# Patient Record
Sex: MALE | Employment: FULL TIME | URBAN - METROPOLITAN AREA
[De-identification: names, ages, dates, MRNs, and addresses within clinical notes are randomized per-mention and may not be internally consistent; named-entity substitution may affect disease eponyms.]

---

## 2020-03-17 ENCOUNTER — APPOINTMENT (OUTPATIENT)
Dept: GENERAL RADIOLOGY | Age: 25
DRG: 854 | End: 2020-03-17
Attending: PHYSICIAN ASSISTANT
Payer: SELF-PAY

## 2020-03-17 ENCOUNTER — APPOINTMENT (OUTPATIENT)
Dept: CT IMAGING | Age: 25
DRG: 854 | End: 2020-03-17
Attending: EMERGENCY MEDICINE
Payer: SELF-PAY

## 2020-03-17 ENCOUNTER — APPOINTMENT (OUTPATIENT)
Dept: GENERAL RADIOLOGY | Age: 25
DRG: 854 | End: 2020-03-17
Attending: EMERGENCY MEDICINE
Payer: SELF-PAY

## 2020-03-17 ENCOUNTER — HOSPITAL ENCOUNTER (INPATIENT)
Age: 25
LOS: 4 days | Discharge: HOME | DRG: 854 | End: 2020-03-23
Attending: EMERGENCY MEDICINE | Admitting: HOSPITALIST
Payer: SELF-PAY

## 2020-03-17 DIAGNOSIS — L03.116 CELLULITIS OF LEFT THIGH: Primary | ICD-10-CM

## 2020-03-17 DIAGNOSIS — L02.416 ABSCESS OF LEFT THIGH: ICD-10-CM

## 2020-03-17 PROBLEM — A41.9 SEPSIS (HCC): Status: ACTIVE | Noted: 2020-03-17

## 2020-03-17 LAB
ALBUMIN SERPL-MCNC: 3.3 G/DL (ref 3.4–5)
ALBUMIN/GLOB SERPL: 0.8 {RATIO} (ref 0.8–1.7)
ALP SERPL-CCNC: 88 U/L (ref 45–117)
ALT SERPL-CCNC: 195 U/L (ref 16–61)
ANION GAP SERPL CALC-SCNC: 7 MMOL/L (ref 3–18)
APPEARANCE UR: CLEAR
AST SERPL-CCNC: 96 U/L (ref 10–38)
BACTERIA URNS QL MICRO: NEGATIVE /HPF
BASOPHILS # BLD: 0 K/UL (ref 0–0.1)
BASOPHILS NFR BLD: 0 % (ref 0–2)
BILIRUB SERPL-MCNC: 1.6 MG/DL (ref 0.2–1)
BILIRUB UR QL: NEGATIVE
BUN SERPL-MCNC: 8 MG/DL (ref 7–18)
BUN/CREAT SERPL: 8 (ref 12–20)
CALCIUM SERPL-MCNC: 8.8 MG/DL (ref 8.5–10.1)
CHLORIDE SERPL-SCNC: 100 MMOL/L (ref 100–111)
CK SERPL-CCNC: 496 U/L (ref 39–308)
CO2 SERPL-SCNC: 27 MMOL/L (ref 21–32)
COLOR UR: YELLOW
CREAT SERPL-MCNC: 1.01 MG/DL (ref 0.6–1.3)
CRP SERPL-MCNC: 27.3 MG/DL (ref 0–0.3)
DIFFERENTIAL METHOD BLD: ABNORMAL
EOSINOPHIL # BLD: 0 K/UL (ref 0–0.4)
EOSINOPHIL NFR BLD: 0 % (ref 0–5)
EPITH CASTS URNS QL MICRO: NORMAL /LPF (ref 0–5)
ERYTHROCYTE [DISTWIDTH] IN BLOOD BY AUTOMATED COUNT: 12 % (ref 11.6–14.5)
ERYTHROCYTE [SEDIMENTATION RATE] IN BLOOD: 26 MM/HR (ref 0–15)
GLOBULIN SER CALC-MCNC: 4 G/DL (ref 2–4)
GLUCOSE SERPL-MCNC: 142 MG/DL (ref 74–99)
GLUCOSE UR STRIP.AUTO-MCNC: NEGATIVE MG/DL
HBA1C MFR BLD: 6 % (ref 4.2–5.6)
HCT VFR BLD AUTO: 47 % (ref 36–48)
HGB BLD-MCNC: 15.8 G/DL (ref 13–16)
HGB UR QL STRIP: NEGATIVE
KETONES UR QL STRIP.AUTO: ABNORMAL MG/DL
LACTATE SERPL-SCNC: 1.8 MMOL/L (ref 0.4–2)
LEUKOCYTE ESTERASE UR QL STRIP.AUTO: NEGATIVE
LYMPHOCYTES # BLD: 0.9 K/UL (ref 0.9–3.6)
LYMPHOCYTES NFR BLD: 7 % (ref 21–52)
MCH RBC QN AUTO: 30.4 PG (ref 24–34)
MCHC RBC AUTO-ENTMCNC: 33.6 G/DL (ref 31–37)
MCV RBC AUTO: 90.6 FL (ref 74–97)
MONOCYTES # BLD: 1.5 K/UL (ref 0.05–1.2)
MONOCYTES NFR BLD: 12 % (ref 3–10)
NEUTS SEG # BLD: 10.3 K/UL (ref 1.8–8)
NEUTS SEG NFR BLD: 81 % (ref 40–73)
NITRITE UR QL STRIP.AUTO: NEGATIVE
PH UR STRIP: 6 [PH] (ref 5–8)
PLATELET # BLD AUTO: 189 K/UL (ref 135–420)
PMV BLD AUTO: 11.4 FL (ref 9.2–11.8)
POTASSIUM SERPL-SCNC: 3.6 MMOL/L (ref 3.5–5.5)
PROT SERPL-MCNC: 7.3 G/DL (ref 6.4–8.2)
PROT UR STRIP-MCNC: 30 MG/DL
RBC # BLD AUTO: 5.19 M/UL (ref 4.7–5.5)
RBC #/AREA URNS HPF: NEGATIVE /HPF (ref 0–5)
SODIUM SERPL-SCNC: 134 MMOL/L (ref 136–145)
SP GR UR REFRACTOMETRY: 1.01 (ref 1–1.03)
UROBILINOGEN UR QL STRIP.AUTO: 1 EU/DL (ref 0.2–1)
WBC # BLD AUTO: 12.8 K/UL (ref 4.6–13.2)
WBC URNS QL MICRO: NORMAL /HPF (ref 0–4)

## 2020-03-17 PROCEDURE — 73701 CT LOWER EXTREMITY W/DYE: CPT

## 2020-03-17 PROCEDURE — 74011250636 HC RX REV CODE- 250/636: Performed by: EMERGENCY MEDICINE

## 2020-03-17 PROCEDURE — 96367 TX/PROPH/DG ADDL SEQ IV INF: CPT

## 2020-03-17 PROCEDURE — 74011250636 HC RX REV CODE- 250/636: Performed by: PHYSICIAN ASSISTANT

## 2020-03-17 PROCEDURE — 96372 THER/PROPH/DIAG INJ SC/IM: CPT

## 2020-03-17 PROCEDURE — 87040 BLOOD CULTURE FOR BACTERIA: CPT

## 2020-03-17 PROCEDURE — 96365 THER/PROPH/DIAG IV INF INIT: CPT

## 2020-03-17 PROCEDURE — 82550 ASSAY OF CK (CPK): CPT

## 2020-03-17 PROCEDURE — 93005 ELECTROCARDIOGRAM TRACING: CPT

## 2020-03-17 PROCEDURE — 80053 COMPREHEN METABOLIC PANEL: CPT

## 2020-03-17 PROCEDURE — 71045 X-RAY EXAM CHEST 1 VIEW: CPT

## 2020-03-17 PROCEDURE — 99285 EMERGENCY DEPT VISIT HI MDM: CPT

## 2020-03-17 PROCEDURE — 83605 ASSAY OF LACTIC ACID: CPT

## 2020-03-17 PROCEDURE — 74011000258 HC RX REV CODE- 258: Performed by: INTERNAL MEDICINE

## 2020-03-17 PROCEDURE — 96366 THER/PROPH/DIAG IV INF ADDON: CPT

## 2020-03-17 PROCEDURE — 86140 C-REACTIVE PROTEIN: CPT

## 2020-03-17 PROCEDURE — 81001 URINALYSIS AUTO W/SCOPE: CPT

## 2020-03-17 PROCEDURE — 73552 X-RAY EXAM OF FEMUR 2/>: CPT

## 2020-03-17 PROCEDURE — 74011250637 HC RX REV CODE- 250/637: Performed by: PHYSICIAN ASSISTANT

## 2020-03-17 PROCEDURE — 85025 COMPLETE CBC W/AUTO DIFF WBC: CPT

## 2020-03-17 PROCEDURE — 74011636320 HC RX REV CODE- 636/320: Performed by: EMERGENCY MEDICINE

## 2020-03-17 PROCEDURE — 99218 HC RM OBSERVATION: CPT

## 2020-03-17 PROCEDURE — 83036 HEMOGLOBIN GLYCOSYLATED A1C: CPT

## 2020-03-17 PROCEDURE — 74011000258 HC RX REV CODE- 258: Performed by: EMERGENCY MEDICINE

## 2020-03-17 PROCEDURE — 86141 C-REACTIVE PROTEIN HS: CPT

## 2020-03-17 PROCEDURE — 90715 TDAP VACCINE 7 YRS/> IM: CPT | Performed by: EMERGENCY MEDICINE

## 2020-03-17 PROCEDURE — 74011250636 HC RX REV CODE- 250/636: Performed by: INTERNAL MEDICINE

## 2020-03-17 PROCEDURE — 96375 TX/PRO/DX INJ NEW DRUG ADDON: CPT

## 2020-03-17 PROCEDURE — 85652 RBC SED RATE AUTOMATED: CPT

## 2020-03-17 RX ORDER — ONDANSETRON 2 MG/ML
4 INJECTION INTRAMUSCULAR; INTRAVENOUS
Status: COMPLETED | OUTPATIENT
Start: 2020-03-17 | End: 2020-03-17

## 2020-03-17 RX ORDER — CLINDAMYCIN PHOSPHATE 600 MG/50ML
600 INJECTION, SOLUTION INTRAVENOUS ONCE
Status: COMPLETED | OUTPATIENT
Start: 2020-03-17 | End: 2020-03-17

## 2020-03-17 RX ORDER — VANCOMYCIN 2 GRAM/500 ML IN 0.9 % SODIUM CHLORIDE INTRAVENOUS
2000
Status: COMPLETED | OUTPATIENT
Start: 2020-03-17 | End: 2020-03-17

## 2020-03-17 RX ORDER — ACETAMINOPHEN 500 MG
1000 TABLET ORAL
Status: COMPLETED | OUTPATIENT
Start: 2020-03-17 | End: 2020-03-17

## 2020-03-17 RX ORDER — LANOLIN ALCOHOL/MO/W.PET/CERES
12 CREAM (GRAM) TOPICAL
Status: DISCONTINUED | OUTPATIENT
Start: 2020-03-17 | End: 2020-03-23 | Stop reason: HOSPADM

## 2020-03-17 RX ORDER — INSULIN LISPRO 100 [IU]/ML
INJECTION, SOLUTION INTRAVENOUS; SUBCUTANEOUS EVERY 6 HOURS
Status: DISCONTINUED | OUTPATIENT
Start: 2020-03-18 | End: 2020-03-18

## 2020-03-17 RX ORDER — MORPHINE SULFATE 2 MG/ML
2-4 INJECTION, SOLUTION INTRAMUSCULAR; INTRAVENOUS
Status: DISCONTINUED | OUTPATIENT
Start: 2020-03-17 | End: 2020-03-20

## 2020-03-17 RX ORDER — ONDANSETRON 2 MG/ML
4 INJECTION INTRAMUSCULAR; INTRAVENOUS
Status: DISCONTINUED | OUTPATIENT
Start: 2020-03-17 | End: 2020-03-23 | Stop reason: HOSPADM

## 2020-03-17 RX ORDER — PANTOPRAZOLE SODIUM 40 MG/1
40 TABLET, DELAYED RELEASE ORAL
Status: ACTIVE | OUTPATIENT
Start: 2020-03-17 | End: 2020-03-18

## 2020-03-17 RX ORDER — MORPHINE SULFATE 4 MG/ML
4 INJECTION, SOLUTION INTRAMUSCULAR; INTRAVENOUS
Status: COMPLETED | OUTPATIENT
Start: 2020-03-17 | End: 2020-03-17

## 2020-03-17 RX ORDER — IBUPROFEN 200 MG
1 TABLET ORAL DAILY PRN
Status: DISCONTINUED | OUTPATIENT
Start: 2020-03-17 | End: 2020-03-18

## 2020-03-17 RX ORDER — DOCUSATE SODIUM 100 MG/1
100 CAPSULE, LIQUID FILLED ORAL
Status: DISCONTINUED | OUTPATIENT
Start: 2020-03-17 | End: 2020-03-23 | Stop reason: HOSPADM

## 2020-03-17 RX ORDER — DEXTROSE MONOHYDRATE 100 MG/ML
125-250 INJECTION, SOLUTION INTRAVENOUS AS NEEDED
Status: DISCONTINUED | OUTPATIENT
Start: 2020-03-17 | End: 2020-03-23 | Stop reason: HOSPADM

## 2020-03-17 RX ORDER — CLINDAMYCIN PHOSPHATE 600 MG/50ML
600 INJECTION INTRAVENOUS ONCE
Status: DISCONTINUED | OUTPATIENT
Start: 2020-03-17 | End: 2020-03-17

## 2020-03-17 RX ORDER — NALOXONE HYDROCHLORIDE 0.4 MG/ML
0.4 INJECTION, SOLUTION INTRAMUSCULAR; INTRAVENOUS; SUBCUTANEOUS AS NEEDED
Status: DISCONTINUED | OUTPATIENT
Start: 2020-03-17 | End: 2020-03-23 | Stop reason: HOSPADM

## 2020-03-17 RX ORDER — IPRATROPIUM BROMIDE AND ALBUTEROL SULFATE 2.5; .5 MG/3ML; MG/3ML
3 SOLUTION RESPIRATORY (INHALATION)
Status: DISCONTINUED | OUTPATIENT
Start: 2020-03-17 | End: 2020-03-23 | Stop reason: HOSPADM

## 2020-03-17 RX ORDER — SODIUM CHLORIDE 9 MG/ML
125 INJECTION, SOLUTION INTRAVENOUS CONTINUOUS
Status: DISPENSED | OUTPATIENT
Start: 2020-03-17 | End: 2020-03-19

## 2020-03-17 RX ORDER — OXYCODONE AND ACETAMINOPHEN 5; 325 MG/1; MG/1
1 TABLET ORAL
Status: DISCONTINUED | OUTPATIENT
Start: 2020-03-17 | End: 2020-03-18

## 2020-03-17 RX ORDER — MAGNESIUM SULFATE 100 %
4 CRYSTALS MISCELLANEOUS AS NEEDED
Status: DISCONTINUED | OUTPATIENT
Start: 2020-03-17 | End: 2020-03-23 | Stop reason: HOSPADM

## 2020-03-17 RX ORDER — KETOROLAC TROMETHAMINE 30 MG/ML
15 INJECTION, SOLUTION INTRAMUSCULAR; INTRAVENOUS
Status: DISPENSED | OUTPATIENT
Start: 2020-03-17 | End: 2020-03-20

## 2020-03-17 RX ORDER — SODIUM CHLORIDE 0.9 % (FLUSH) 0.9 %
5-10 SYRINGE (ML) INJECTION AS NEEDED
Status: DISCONTINUED | OUTPATIENT
Start: 2020-03-17 | End: 2020-03-23 | Stop reason: HOSPADM

## 2020-03-17 RX ORDER — ACETAMINOPHEN 325 MG/1
650 TABLET ORAL
Status: DISCONTINUED | OUTPATIENT
Start: 2020-03-17 | End: 2020-03-18

## 2020-03-17 RX ADMIN — TETANUS TOXOID, REDUCED DIPHTHERIA TOXOID AND ACELLULAR PERTUSSIS VACCINE, ADSORBED 0.5 ML: 5; 2.5; 8; 8; 2.5 SUSPENSION INTRAMUSCULAR at 14:32

## 2020-03-17 RX ADMIN — SODIUM CHLORIDE 2313 ML: 900 INJECTION, SOLUTION INTRAVENOUS at 13:40

## 2020-03-17 RX ADMIN — CLINDAMYCIN PHOSPHATE 600 MG: 600 INJECTION, SOLUTION INTRAVENOUS at 13:54

## 2020-03-17 RX ADMIN — PIPERACILLIN AND TAZOBACTAM 3.38 G: 3; .375 INJECTION, POWDER, LYOPHILIZED, FOR SOLUTION INTRAVENOUS at 13:43

## 2020-03-17 RX ADMIN — IOPAMIDOL 94 ML: 612 INJECTION, SOLUTION INTRAVENOUS at 16:45

## 2020-03-17 RX ADMIN — SODIUM CHLORIDE 125 ML/HR: 900 INJECTION, SOLUTION INTRAVENOUS at 22:17

## 2020-03-17 RX ADMIN — ACETAMINOPHEN 1000 MG: 500 TABLET, FILM COATED ORAL at 18:17

## 2020-03-17 RX ADMIN — MORPHINE SULFATE 4 MG: 4 INJECTION, SOLUTION INTRAMUSCULAR; INTRAVENOUS at 13:44

## 2020-03-17 RX ADMIN — ONDANSETRON 4 MG: 2 INJECTION INTRAMUSCULAR; INTRAVENOUS at 13:44

## 2020-03-17 RX ADMIN — VANCOMYCIN HYDROCHLORIDE 2000 MG: 10 INJECTION, POWDER, LYOPHILIZED, FOR SOLUTION INTRAVENOUS at 13:43

## 2020-03-17 RX ADMIN — PIPERACILLIN AND TAZOBACTAM 3.38 G: 3; .375 INJECTION, POWDER, LYOPHILIZED, FOR SOLUTION INTRAVENOUS at 23:58

## 2020-03-17 NOTE — CONSULTS
Consult Note        Assessment:    1. Left thigh cellulitis, evaluate for abscess      PLAN:    1. Case discussed with Dr Esau Gitelman. He recommends, IM admission for IV abx. Blood cultures and we will follow up on CT. NPO past midnight tonight. Frequent neurovascular checks although no sign of compartment syndrome on exam at this time. HPI: Fausto Crespo is a 22 y.o. male patient presents with left thigh swelling, redness and drainage. He does not recall a history of injury but states that the redness and swelling have progressed over 5 days. He now has a small wound at the anterior thigh with drainage. He is otherwise healthy and active. He is febrile at 101.3 with a white count of 12.8. Sed rate elevated at 26. CRP pending    History reviewed. No pertinent past medical history. History reviewed. No pertinent surgical history. Prior to Admission medications    Not on File   No Known Allergies   Social History     Socioeconomic History    Marital status: SINGLE     Spouse name: Not on file    Number of children: Not on file    Years of education: Not on file    Highest education level: Not on file   Occupational History    Not on file   Social Needs    Financial resource strain: Not on file    Food insecurity     Worry: Not on file     Inability: Not on file    Transportation needs     Medical: Not on file     Non-medical: Not on file   Tobacco Use    Smoking status: Light Tobacco Smoker    Smokeless tobacco: Never Used   Substance and Sexual Activity    Alcohol use:  Yes    Drug use: Not on file    Sexual activity: Not on file   Lifestyle    Physical activity     Days per week: Not on file     Minutes per session: Not on file    Stress: Not on file   Relationships    Social connections     Talks on phone: Not on file     Gets together: Not on file     Attends Confucianism service: Not on file     Active member of club or organization: Not on file     Attends meetings of clubs or organizations: Not on file     Relationship status: Not on file    Intimate partner violence     Fear of current or ex partner: Not on file     Emotionally abused: Not on file     Physically abused: Not on file     Forced sexual activity: Not on file   Other Topics Concern    Not on file   Social History Narrative    Not on file       Blood pressure 133/70, pulse (!) 112, temperature (!) 101.3 °F (38.5 °C), resp. rate 20, height 4' 10\" (1.473 m), weight 77.1 kg (170 lb), SpO2 100 %. CBC w/Diff   Lab Results   Component Value Date/Time    WBC 12.8 03/17/2020 01:30 PM    RBC 5.19 03/17/2020 01:30 PM    HCT 47.0 03/17/2020 01:30 PM          Physical Assessment:  General: in no apparent distress   Wound:  Small pin hole wound at the anterior, mid portion of the thigh with mild purulent drainage. Fluctuance in the area. Extremities:  Diffuse erythema and swelling to the anterior thigh. Compartments are soft and no severe pain on palpation. No pain with left hip motion or knee motion. No knee effusion. Will flex and extend ankles and toes to command. Sensation intact to light touch including the first web space   Dressing:  None ortho   Urologic voiding   DVT Exam:     No exam evidence to suggest DVT. Compartments soft and NT. Radiology:   Left femur xrays:  BONES: No evidence of acute fracture or dislocation. Joint spaces and alignment  are maintained. No area of erosion or aggressive-appearing bone destruction. .     SOFT TISSUES: Soft tissue swelling about the lateral thigh    CT femur pending         - Faith Cooper PA-C  3/17/2020  Office [phone]  Cell [phone]

## 2020-03-17 NOTE — ED NOTES
I performed a brief history of the patient here in triage and I have determined that pt will need further treatment and evaluation from the main side ER physician. I have placed initial orders based on the history to help in expediting patients care. Pt with extensive cellulitis to left thigh, tachycardia and fever.      Visit Vitals  /81 (BP 1 Location: Left arm, BP Patient Position: Sitting)   Pulse (!) 144   Temp (!) 101.3 °F (38.5 °C)   Resp 20   Ht 4' 10\" (1.473 m)   Wt 77.1 kg (170 lb)   SpO2 100%   BMI 35.53 kg/m²      SIMON Michaels 12:25 PM

## 2020-03-17 NOTE — ED NOTES
5:35 PM   Assumed care of the pt from preceding provider Dr. Edwina Cooley (who took on care this morning from Dr. Holland Hernandez). I have discussed the case in detail with Dr. Edwina Cooley. Pt is back in the room from CT scan. Sepsis bundle has been initiated and the pt has received clindamycin, vancomycin, and zosyn IV. Ortho has already been consulted as the pt appears to have a deep abscess tracking into the muscle. If there is in fact muscle involvement on the CT scan the pt will be admitted with ortho/surgery care for a washout (likely in the morning). I have seen and reexamined the patient at bedside. Patient is resting, in no apparent distress. Repeat temperature is 100.1 °F oral.  Patient denies pain and nausea at this time. Compartments are soft, not concerning for compartment syndrome. Patient is made aware that we are still waiting on the CT report to determine the exact course of his admission. We will plan to consult Ortho versus general surgery once his CT report is resulted. Estrella Hoyt PA-C     6:03 PM CT resulted, shows superficial and deep cellulitis, extending to the vastus lateralis muscle. Ortho paged again. Jimmie Simmons PA-C     6:40 PM Spoke with Dr. Oneida Pham ortho on call who recommends admitting the pt under the hospitalist service with ortho consult in the morning. Pt is recommended to remain NPO until he is seen by ortho in the morning and is advised to have an A1C level drawn as well given, his glucose of 142. This has been ordered. Hospitalist has been paged. Jimmie Simmons PA-C     6:58 PM Spoke with Dr. Poornima Bill, hospitalist on call until 7 pm. Dr. Poornima Bill made aware of the pt but will be passing him along to Dr. Jonelle Perez, night hospitalist for consult. Estrella Broussard PA-C     7:11 PM Spoke with mckenzie Trinidad hospitalist on call, and discussed the case. Dr. Jonelle Perez agrees to admit the pt at this time.  Estrella Hoyt PA-C

## 2020-03-17 NOTE — ED PROVIDER NOTES
EMERGENCY DEPARTMENT HISTORY AND PHYSICAL EXAM      Date: 3/17/2020  Patient Name: Lauren Wilson    History of Presenting Illness     Chief Complaint   Patient presents with    Skin Infection       History Provided By: Patient    Chief Complaint: Painful swelling left thigh    Additional History (Context): Lauren Wilson is a 22 y.o. male with No significant past medical history who presents with a painful swollen left thigh. No specific history of trauma or injury, however while he is sleeping thinks he may have gotten poked with something or bitten by something five days ago in his anterior right thigh. Complains of significant and worsening pain and redness and warmth to his entire thigh from the knee to the groin, as well as anterior mid thigh with a boil lesion that has had some drainage. Also complains of subjective fevers and generalized malaise and fatigue and weakness. PCP: None    Current Facility-Administered Medications   Medication Dose Route Frequency Provider Last Rate Last Dose    sodium chloride (NS) flush 5-10 mL  5-10 mL IntraVENous PRN SIMON Locke        vancomycin (VANCOCIN) 2000 mg in  ml infusion  2,000 mg IntraVENous NOW Cody Vera  mL/hr at 03/17/20 1343 2,000 mg at 03/17/20 1343    piperacillin-tazobactam (ZOSYN) 3.375 g in 0.9% sodium chloride (MBP/ADV) 100 mL MBP  3.375 g IntraVENous ONCE Cody Vera  mL/hr at 03/17/20 1343 3.375 g at 03/17/20 1343    clindamycin (CLEOCIN) 600mg NS 50 mL IVPB (premix)  600 mg IntraVENous ONCE Cody Vera MD   600 mg at 03/17/20 1354    diph,Pertuss(AC),Tet Vac-PF (BOOSTRIX) suspension 0.5 mL  0.5 mL IntraMUSCular PRIOR TO DISCHARGE Cody Vera MD           Past History     Past Medical History:  History reviewed. No pertinent past medical history. Past Surgical History:  History reviewed. No pertinent surgical history. Family History:  History reviewed.  No pertinent family history. Social History:  Social History     Tobacco Use    Smoking status: Light Tobacco Smoker    Smokeless tobacco: Never Used   Substance Use Topics    Alcohol use: Yes    Drug use: Not on file       Allergies:  No Known Allergies      Review of Systems   Review of Systems   Constitutional: Positive for chills, fatigue and unexpected weight change. Negative for fever. HENT: Negative for congestion, rhinorrhea, sore throat and trouble swallowing. Eyes: Negative for discharge, redness and itching. Respiratory: Negative for cough, shortness of breath, wheezing and stridor. Cardiovascular: Positive for leg swelling. Negative for chest pain and palpitations. Gastrointestinal: Negative for abdominal pain, blood in stool, diarrhea, nausea and vomiting. Genitourinary: Negative for difficulty urinating and dysuria. Musculoskeletal: Positive for myalgias. Negative for arthralgias, back pain and joint swelling. Skin: Positive for color change, rash and wound. Neurological: Negative for syncope and light-headedness. Psychiatric/Behavioral: Negative for behavioral problems and confusion. All other systems reviewed and are negative. Physical Exam     Vitals:    03/17/20 1222   BP: 130/81   Pulse: (!) 144   Resp: 20   Temp: (!) 101.3 °F (38.5 °C)   SpO2: 100%   Weight: 77.1 kg (170 lb)   Height: 4' 10\" (1.473 m)     Physical Exam  Vitals signs and nursing note reviewed. Constitutional:       General: He is in acute distress. Appearance: He is well-developed and normal weight. He is ill-appearing. HENT:      Head: Normocephalic and atraumatic. Mouth/Throat:      Mouth: Mucous membranes are moist.   Eyes:      Extraocular Movements: Extraocular movements intact. Conjunctiva/sclera: Conjunctivae normal.      Pupils: Pupils are equal, round, and reactive to light. Neck:      Musculoskeletal: Normal range of motion and neck supple.    Cardiovascular:      Rate and Rhythm: Regular rhythm. Tachycardia present. Pulses: Normal pulses. Heart sounds: Normal heart sounds. No murmur. Pulmonary:      Effort: Pulmonary effort is normal.      Breath sounds: Normal breath sounds. No wheezing. Abdominal:      General: Bowel sounds are normal.      Palpations: Abdomen is soft. Tenderness: There is no abdominal tenderness. Musculoskeletal: Normal range of motion. General: Swelling, tenderness and signs of injury present. Comments: Left thigh anteriorly with a approximately 1 cm diameter scab. Has marked swelling of the entire thigh from the groin to the knee anteriorly laterally and posterior, as well as fluctuance in that area. Bedside ultrasound shows an extensive abscess tracking underneath the skin above the fascia. Neurovascular intact distally. Skin:     General: Skin is warm and dry. Capillary Refill: Capillary refill takes less than 2 seconds. Findings: Erythema, lesion and rash present. Neurological:      Mental Status: He is alert and oriented to person, place, and time. Psychiatric:         Behavior: Behavior normal.           Diagnostic Study Results     Labs -     Recent Results (from the past 12 hour(s))   CBC WITH AUTOMATED DIFF    Collection Time: 03/17/20  1:30 PM   Result Value Ref Range    WBC 12.8 4.6 - 13.2 K/uL    RBC 5.19 4.70 - 5.50 M/uL    HGB 15.8 13.0 - 16.0 g/dL    HCT 47.0 36.0 - 48.0 %    MCV 90.6 74.0 - 97.0 FL    MCH 30.4 24.0 - 34.0 PG    MCHC 33.6 31.0 - 37.0 g/dL    RDW 12.0 11.6 - 14.5 %    PLATELET 033 787 - 805 K/uL    MPV 11.4 9.2 - 11.8 FL    NEUTROPHILS 81 (H) 40 - 73 %    LYMPHOCYTES 7 (L) 21 - 52 %    MONOCYTES 12 (H) 3 - 10 %    EOSINOPHILS 0 0 - 5 %    BASOPHILS 0 0 - 2 %    ABS. NEUTROPHILS 10.3 (H) 1.8 - 8.0 K/UL    ABS. LYMPHOCYTES 0.9 0.9 - 3.6 K/UL    ABS. MONOCYTES 1.5 (H) 0.05 - 1.2 K/UL    ABS. EOSINOPHILS 0.0 0.0 - 0.4 K/UL    ABS.  BASOPHILS 0.0 0.0 - 0.1 K/UL    DF AUTOMATED Radiologic Studies -   XR CHEST PORT   Final Result   IMPRESSION:      No active cardiopulmonary disease. XR FEMUR RT 2 VS    (Results Pending)   CT LOW EXT LT W CONT    (Results Pending)     CT Results  (Last 48 hours)    None        CXR Results  (Last 48 hours)               03/17/20 1314  XR CHEST PORT Final result    Impression:  IMPRESSION:       No active cardiopulmonary disease. Narrative:  EXAM: XR CHEST PORT       CLINICAL INDICATION/HISTORY: meets SIRS criteria. Shortness of breath. Chest   pain.   -Additional: None       COMPARISON: None       TECHNIQUE: Portable frontal view of the chest       _______________       FINDINGS:       SUPPORT DEVICES: None. HEART AND MEDIASTINUM: Unremarkable. LUNGS AND PLEURAL SPACES: Unremarkable. BONY THORAX AND SOFT TISSUES: Unremarkable.       _______________                   Medical Decision Making   I am the first provider for this patient. I reviewed the vital signs, available nursing notes, past medical history, past surgical history, family history and social history. Vital Signs-Reviewed the patient's vital signs. Records Reviewed: Nursing Notes    ED Course:   Felt better after fluids and analgesics and antibiotics in the emergency department    Disposition:  Pending at time of turnover      Provider Notes (Medical Decision Making):   Turnover at 2pm:  Significant infection of the left thigh rule out deep space infection, lesser concern for necrotizing fasciitis. Broad-spectrum antibiotics have been given, broad-based diagnostic evaluation ordered to include labs and plain x-rays and a CT scan. Orthopedics has been consulted to this is aware and will follow as imaging becomes available. Consult:  Discussed care with Regina, Specialty: orthopedics PA. Standard discussion; including history of patients chief complaint, available diagnostic results, and treatment course. Will see after CT/imaging.   1:54 PM, 3/17/2020     1:55 PM : Pt care transferred to Dr. Woody Loving. ,ED provider. History of patient complaint(s), available diagnostic reports and current treatment plan has been discussed thoroughly. Bedside rounding on patient occured : yes . Intended disposition of patient: admit. Pending diagnostics reports and/or labs (please list): labs, xrays, CT, consults. Diagnosis     Clinical Impression:   1. Cellulitis of left thigh    2.  Abscess of left thigh

## 2020-03-18 ENCOUNTER — ANESTHESIA EVENT (OUTPATIENT)
Dept: SURGERY | Age: 25
DRG: 854 | End: 2020-03-18
Payer: SELF-PAY

## 2020-03-18 ENCOUNTER — ANESTHESIA (OUTPATIENT)
Dept: SURGERY | Age: 25
DRG: 854 | End: 2020-03-18
Payer: SELF-PAY

## 2020-03-18 PROBLEM — R73.03 PREDIABETES: Status: ACTIVE | Noted: 2020-03-18

## 2020-03-18 LAB
ALBUMIN SERPL-MCNC: 2.4 G/DL (ref 3.4–5)
ALBUMIN/GLOB SERPL: 0.7 {RATIO} (ref 0.8–1.7)
ALP SERPL-CCNC: 70 U/L (ref 45–117)
ALT SERPL-CCNC: 137 U/L (ref 16–61)
ANION GAP SERPL CALC-SCNC: 9 MMOL/L (ref 3–18)
AST SERPL-CCNC: 55 U/L (ref 10–38)
ATRIAL RATE: 109 BPM
BASOPHILS # BLD: 0 K/UL (ref 0–0.1)
BASOPHILS NFR BLD: 0 % (ref 0–2)
BILIRUB SERPL-MCNC: 1 MG/DL (ref 0.2–1)
BUN SERPL-MCNC: 8 MG/DL (ref 7–18)
BUN/CREAT SERPL: 10 (ref 12–20)
CALCIUM SERPL-MCNC: 8.4 MG/DL (ref 8.5–10.1)
CALCULATED P AXIS, ECG09: 57 DEGREES
CALCULATED R AXIS, ECG10: 69 DEGREES
CALCULATED T AXIS, ECG11: 8 DEGREES
CHLORIDE SERPL-SCNC: 105 MMOL/L (ref 100–111)
CO2 SERPL-SCNC: 26 MMOL/L (ref 21–32)
CREAT SERPL-MCNC: 0.78 MG/DL (ref 0.6–1.3)
CRP SERPL HS-MCNC: >9.5 MG/L
DIAGNOSIS, 93000: NORMAL
DIFFERENTIAL METHOD BLD: ABNORMAL
EOSINOPHIL # BLD: 0.1 K/UL (ref 0–0.4)
EOSINOPHIL NFR BLD: 1 % (ref 0–5)
ERYTHROCYTE [DISTWIDTH] IN BLOOD BY AUTOMATED COUNT: 12.2 % (ref 11.6–14.5)
GLOBULIN SER CALC-MCNC: 3.4 G/DL (ref 2–4)
GLUCOSE BLD STRIP.AUTO-MCNC: 109 MG/DL (ref 70–110)
GLUCOSE BLD STRIP.AUTO-MCNC: 110 MG/DL (ref 70–110)
GLUCOSE BLD STRIP.AUTO-MCNC: 90 MG/DL (ref 70–110)
GLUCOSE SERPL-MCNC: 96 MG/DL (ref 74–99)
HCT VFR BLD AUTO: 41.8 % (ref 36–48)
HGB BLD-MCNC: 13.9 G/DL (ref 13–16)
INR PPP: 1.3 (ref 0.8–1.2)
LYMPHOCYTES # BLD: 1.2 K/UL (ref 0.9–3.6)
LYMPHOCYTES NFR BLD: 10 % (ref 21–52)
MCH RBC QN AUTO: 30.4 PG (ref 24–34)
MCHC RBC AUTO-ENTMCNC: 33.3 G/DL (ref 31–37)
MCV RBC AUTO: 91.5 FL (ref 74–97)
MONOCYTES # BLD: 1.3 K/UL (ref 0.05–1.2)
MONOCYTES NFR BLD: 10 % (ref 3–10)
NEUTS SEG # BLD: 10 K/UL (ref 1.8–8)
NEUTS SEG NFR BLD: 79 % (ref 40–73)
P-R INTERVAL, ECG05: 134 MS
PLATELET # BLD AUTO: 201 K/UL (ref 135–420)
PMV BLD AUTO: 11.3 FL (ref 9.2–11.8)
POTASSIUM SERPL-SCNC: 3.7 MMOL/L (ref 3.5–5.5)
PROT SERPL-MCNC: 5.8 G/DL (ref 6.4–8.2)
PROTHROMBIN TIME: 16.3 SEC (ref 11.5–15.2)
Q-T INTERVAL, ECG07: 296 MS
QRS DURATION, ECG06: 82 MS
QTC CALCULATION (BEZET), ECG08: 398 MS
RBC # BLD AUTO: 4.57 M/UL (ref 4.7–5.5)
SODIUM SERPL-SCNC: 140 MMOL/L (ref 136–145)
VENTRICULAR RATE, ECG03: 109 BPM
WBC # BLD AUTO: 12.6 K/UL (ref 4.6–13.2)

## 2020-03-18 PROCEDURE — 74011250636 HC RX REV CODE- 250/636: Performed by: ORTHOPAEDIC SURGERY

## 2020-03-18 PROCEDURE — 96375 TX/PRO/DX INJ NEW DRUG ADDON: CPT

## 2020-03-18 PROCEDURE — 74011250636 HC RX REV CODE- 250/636: Performed by: NURSE ANESTHETIST, CERTIFIED REGISTERED

## 2020-03-18 PROCEDURE — 87077 CULTURE AEROBIC IDENTIFY: CPT

## 2020-03-18 PROCEDURE — 82962 GLUCOSE BLOOD TEST: CPT

## 2020-03-18 PROCEDURE — 85025 COMPLETE CBC W/AUTO DIFF WBC: CPT

## 2020-03-18 PROCEDURE — 74011000258 HC RX REV CODE- 258: Performed by: INTERNAL MEDICINE

## 2020-03-18 PROCEDURE — 76010000149 HC OR TIME 1 TO 1.5 HR: Performed by: ORTHOPAEDIC SURGERY

## 2020-03-18 PROCEDURE — 77030019952 HC CANSTR VAC ASST KCON -B

## 2020-03-18 PROCEDURE — 74011250636 HC RX REV CODE- 250/636: Performed by: INTERNAL MEDICINE

## 2020-03-18 PROCEDURE — 87205 SMEAR GRAM STAIN: CPT

## 2020-03-18 PROCEDURE — 77030002916 HC SUT ETHLN J&J -A: Performed by: ORTHOPAEDIC SURGERY

## 2020-03-18 PROCEDURE — 74011000250 HC RX REV CODE- 250: Performed by: NURSE ANESTHETIST, CERTIFIED REGISTERED

## 2020-03-18 PROCEDURE — 96366 THER/PROPH/DIAG IV INF ADDON: CPT

## 2020-03-18 PROCEDURE — 74011000272 HC RX REV CODE- 272: Performed by: ORTHOPAEDIC SURGERY

## 2020-03-18 PROCEDURE — 74011000258 HC RX REV CODE- 258: Performed by: ORTHOPAEDIC SURGERY

## 2020-03-18 PROCEDURE — 74011250637 HC RX REV CODE- 250/637: Performed by: ORTHOPAEDIC SURGERY

## 2020-03-18 PROCEDURE — 77030019934 HC DRSG VAC ASST KCON -B: Performed by: ORTHOPAEDIC SURGERY

## 2020-03-18 PROCEDURE — 99218 HC RM OBSERVATION: CPT

## 2020-03-18 PROCEDURE — 36415 COLL VENOUS BLD VENIPUNCTURE: CPT

## 2020-03-18 PROCEDURE — 76060000033 HC ANESTHESIA 1 TO 1.5 HR: Performed by: ORTHOPAEDIC SURGERY

## 2020-03-18 PROCEDURE — 76210000006 HC OR PH I REC 0.5 TO 1 HR: Performed by: ORTHOPAEDIC SURGERY

## 2020-03-18 PROCEDURE — 80053 COMPREHEN METABOLIC PANEL: CPT

## 2020-03-18 PROCEDURE — 0K9R0ZX DRAINAGE OF LEFT UPPER LEG MUSCLE, OPEN APPROACH, DIAGNOSTIC: ICD-10-PCS | Performed by: ORTHOPAEDIC SURGERY

## 2020-03-18 PROCEDURE — 74011000250 HC RX REV CODE- 250: Performed by: ORTHOPAEDIC SURGERY

## 2020-03-18 PROCEDURE — 77030031139 HC SUT VCRL2 J&J -A: Performed by: ORTHOPAEDIC SURGERY

## 2020-03-18 PROCEDURE — 77030012510 HC MSK AIRWY LMA TELE -B: Performed by: ANESTHESIOLOGY

## 2020-03-18 PROCEDURE — 77030018836 HC SOL IRR NACL ICUM -A: Performed by: ORTHOPAEDIC SURGERY

## 2020-03-18 PROCEDURE — 87186 SC STD MICRODIL/AGAR DIL: CPT

## 2020-03-18 PROCEDURE — 85610 PROTHROMBIN TIME: CPT

## 2020-03-18 PROCEDURE — L1830 KO IMMOB CANVAS LONG PRE OTS: HCPCS | Performed by: ORTHOPAEDIC SURGERY

## 2020-03-18 PROCEDURE — 96376 TX/PRO/DX INJ SAME DRUG ADON: CPT

## 2020-03-18 PROCEDURE — 87075 CULTR BACTERIA EXCEPT BLOOD: CPT

## 2020-03-18 RX ORDER — SODIUM CHLORIDE 0.9 % (FLUSH) 0.9 %
5-40 SYRINGE (ML) INJECTION EVERY 8 HOURS
Status: DISCONTINUED | OUTPATIENT
Start: 2020-03-18 | End: 2020-03-23 | Stop reason: HOSPADM

## 2020-03-18 RX ORDER — MORPHINE SULFATE 2 MG/ML
2 INJECTION, SOLUTION INTRAMUSCULAR; INTRAVENOUS
Status: DISCONTINUED | OUTPATIENT
Start: 2020-03-18 | End: 2020-03-23 | Stop reason: HOSPADM

## 2020-03-18 RX ORDER — ONDANSETRON 2 MG/ML
4 INJECTION INTRAMUSCULAR; INTRAVENOUS ONCE
Status: DISCONTINUED | OUTPATIENT
Start: 2020-03-18 | End: 2020-03-18 | Stop reason: HOSPADM

## 2020-03-18 RX ORDER — HYDROMORPHONE HYDROCHLORIDE 1 MG/ML
0.5 INJECTION, SOLUTION INTRAMUSCULAR; INTRAVENOUS; SUBCUTANEOUS
Status: DISCONTINUED | OUTPATIENT
Start: 2020-03-18 | End: 2020-03-18 | Stop reason: HOSPADM

## 2020-03-18 RX ORDER — OXYCODONE AND ACETAMINOPHEN 10; 325 MG/1; MG/1
2 TABLET ORAL
Status: DISCONTINUED | OUTPATIENT
Start: 2020-03-18 | End: 2020-03-23 | Stop reason: HOSPADM

## 2020-03-18 RX ORDER — PROPOFOL 10 MG/ML
INJECTION, EMULSION INTRAVENOUS AS NEEDED
Status: DISCONTINUED | OUTPATIENT
Start: 2020-03-18 | End: 2020-03-18 | Stop reason: HOSPADM

## 2020-03-18 RX ORDER — FENTANYL CITRATE 50 UG/ML
25 INJECTION, SOLUTION INTRAMUSCULAR; INTRAVENOUS AS NEEDED
Status: COMPLETED | OUTPATIENT
Start: 2020-03-18 | End: 2020-03-18

## 2020-03-18 RX ORDER — CEFAZOLIN SODIUM 1 G/3ML
INJECTION, POWDER, FOR SOLUTION INTRAMUSCULAR; INTRAVENOUS AS NEEDED
Status: DISCONTINUED | OUTPATIENT
Start: 2020-03-18 | End: 2020-03-18 | Stop reason: HOSPADM

## 2020-03-18 RX ORDER — DIPHENHYDRAMINE HYDROCHLORIDE 50 MG/ML
12.5 INJECTION, SOLUTION INTRAMUSCULAR; INTRAVENOUS
Status: DISCONTINUED | OUTPATIENT
Start: 2020-03-18 | End: 2020-03-18 | Stop reason: HOSPADM

## 2020-03-18 RX ORDER — INSULIN LISPRO 100 [IU]/ML
INJECTION, SOLUTION INTRAVENOUS; SUBCUTANEOUS
Status: DISCONTINUED | OUTPATIENT
Start: 2020-03-18 | End: 2020-03-23 | Stop reason: HOSPADM

## 2020-03-18 RX ORDER — FENTANYL CITRATE 50 UG/ML
INJECTION, SOLUTION INTRAMUSCULAR; INTRAVENOUS AS NEEDED
Status: DISCONTINUED | OUTPATIENT
Start: 2020-03-18 | End: 2020-03-18 | Stop reason: HOSPADM

## 2020-03-18 RX ORDER — SODIUM CHLORIDE, SODIUM LACTATE, POTASSIUM CHLORIDE, CALCIUM CHLORIDE 600; 310; 30; 20 MG/100ML; MG/100ML; MG/100ML; MG/100ML
100 INJECTION, SOLUTION INTRAVENOUS CONTINUOUS
Status: DISCONTINUED | OUTPATIENT
Start: 2020-03-18 | End: 2020-03-18 | Stop reason: HOSPADM

## 2020-03-18 RX ORDER — OXYCODONE AND ACETAMINOPHEN 5; 325 MG/1; MG/1
1 TABLET ORAL
Status: DISCONTINUED | OUTPATIENT
Start: 2020-03-18 | End: 2020-03-23 | Stop reason: HOSPADM

## 2020-03-18 RX ORDER — SODIUM CHLORIDE 0.9 % (FLUSH) 0.9 %
5-40 SYRINGE (ML) INJECTION AS NEEDED
Status: DISCONTINUED | OUTPATIENT
Start: 2020-03-18 | End: 2020-03-23 | Stop reason: HOSPADM

## 2020-03-18 RX ORDER — MIDAZOLAM HYDROCHLORIDE 1 MG/ML
INJECTION, SOLUTION INTRAMUSCULAR; INTRAVENOUS AS NEEDED
Status: DISCONTINUED | OUTPATIENT
Start: 2020-03-18 | End: 2020-03-18 | Stop reason: HOSPADM

## 2020-03-18 RX ORDER — DEXAMETHASONE SODIUM PHOSPHATE 4 MG/ML
INJECTION, SOLUTION INTRA-ARTICULAR; INTRALESIONAL; INTRAMUSCULAR; INTRAVENOUS; SOFT TISSUE AS NEEDED
Status: DISCONTINUED | OUTPATIENT
Start: 2020-03-18 | End: 2020-03-18 | Stop reason: HOSPADM

## 2020-03-18 RX ORDER — SODIUM CHLORIDE, SODIUM LACTATE, POTASSIUM CHLORIDE, CALCIUM CHLORIDE 600; 310; 30; 20 MG/100ML; MG/100ML; MG/100ML; MG/100ML
INJECTION, SOLUTION INTRAVENOUS
Status: DISCONTINUED | OUTPATIENT
Start: 2020-03-18 | End: 2020-03-18 | Stop reason: HOSPADM

## 2020-03-18 RX ORDER — PHENYLEPHRINE HCL IN 0.9% NACL 1 MG/10 ML
SYRINGE (ML) INTRAVENOUS AS NEEDED
Status: DISCONTINUED | OUTPATIENT
Start: 2020-03-18 | End: 2020-03-18 | Stop reason: HOSPADM

## 2020-03-18 RX ORDER — ONDANSETRON 2 MG/ML
INJECTION INTRAMUSCULAR; INTRAVENOUS AS NEEDED
Status: DISCONTINUED | OUTPATIENT
Start: 2020-03-18 | End: 2020-03-18 | Stop reason: HOSPADM

## 2020-03-18 RX ORDER — LIDOCAINE HYDROCHLORIDE 20 MG/ML
INJECTION, SOLUTION EPIDURAL; INFILTRATION; INTRACAUDAL; PERINEURAL AS NEEDED
Status: DISCONTINUED | OUTPATIENT
Start: 2020-03-18 | End: 2020-03-18 | Stop reason: HOSPADM

## 2020-03-18 RX ADMIN — Medication 200 MCG: at 17:04

## 2020-03-18 RX ADMIN — CEFAZOLIN 2 G: 1 INJECTION, POWDER, FOR SOLUTION INTRAVENOUS at 17:21

## 2020-03-18 RX ADMIN — Medication 300 MCG: at 17:31

## 2020-03-18 RX ADMIN — Medication 200 MCG: at 17:15

## 2020-03-18 RX ADMIN — KETOROLAC TROMETHAMINE 15 MG: 30 INJECTION, SOLUTION INTRAMUSCULAR at 08:29

## 2020-03-18 RX ADMIN — MIDAZOLAM 2 MG: 1 INJECTION INTRAMUSCULAR; INTRAVENOUS at 16:52

## 2020-03-18 RX ADMIN — FENTANYL CITRATE 50 MCG: 50 INJECTION, SOLUTION INTRAMUSCULAR; INTRAVENOUS at 17:18

## 2020-03-18 RX ADMIN — KETOROLAC TROMETHAMINE 15 MG: 30 INJECTION, SOLUTION INTRAMUSCULAR at 13:58

## 2020-03-18 RX ADMIN — PROPOFOL 200 MG: 10 INJECTION, EMULSION INTRAVENOUS at 16:52

## 2020-03-18 RX ADMIN — PIPERACILLIN AND TAZOBACTAM 3.38 G: 3; .375 INJECTION, POWDER, LYOPHILIZED, FOR SOLUTION INTRAVENOUS at 11:17

## 2020-03-18 RX ADMIN — MORPHINE SULFATE 2 MG: 2 INJECTION, SOLUTION INTRAMUSCULAR; INTRAVENOUS at 21:06

## 2020-03-18 RX ADMIN — SODIUM CHLORIDE, SODIUM LACTATE, POTASSIUM CHLORIDE, AND CALCIUM CHLORIDE: 600; 310; 30; 20 INJECTION, SOLUTION INTRAVENOUS at 16:34

## 2020-03-18 RX ADMIN — ONDANSETRON 4 MG: 2 INJECTION INTRAMUSCULAR; INTRAVENOUS at 13:58

## 2020-03-18 RX ADMIN — FENTANYL CITRATE 25 MCG: 50 INJECTION INTRAMUSCULAR; INTRAVENOUS at 18:28

## 2020-03-18 RX ADMIN — Medication 300 MCG: at 17:46

## 2020-03-18 RX ADMIN — ONDANSETRON 4 MG: 2 SOLUTION INTRAMUSCULAR; INTRAVENOUS at 17:28

## 2020-03-18 RX ADMIN — OXYCODONE HYDROCHLORIDE AND ACETAMINOPHEN 2 TABLET: 10; 325 TABLET ORAL at 19:04

## 2020-03-18 RX ADMIN — PIPERACILLIN AND TAZOBACTAM 3.38 G: 3; .375 INJECTION, POWDER, LYOPHILIZED, FOR SOLUTION INTRAVENOUS at 06:00

## 2020-03-18 RX ADMIN — SODIUM CHLORIDE 125 ML/HR: 900 INJECTION, SOLUTION INTRAVENOUS at 19:06

## 2020-03-18 RX ADMIN — LIDOCAINE HYDROCHLORIDE 100 MG: 20 INJECTION, SOLUTION INTRAVENOUS at 16:52

## 2020-03-18 RX ADMIN — SODIUM CHLORIDE 1250 MG: 900 INJECTION, SOLUTION INTRAVENOUS at 02:22

## 2020-03-18 RX ADMIN — PIPERACILLIN AND TAZOBACTAM 3.38 G: 3; .375 INJECTION, POWDER, LYOPHILIZED, FOR SOLUTION INTRAVENOUS at 19:03

## 2020-03-18 RX ADMIN — SODIUM CHLORIDE 125 ML/HR: 900 INJECTION, SOLUTION INTRAVENOUS at 08:34

## 2020-03-18 RX ADMIN — DEXAMETHASONE SODIUM PHOSPHATE 8 MG: 4 INJECTION, SOLUTION INTRA-ARTICULAR; INTRALESIONAL; INTRAMUSCULAR; INTRAVENOUS; SOFT TISSUE at 17:11

## 2020-03-18 RX ADMIN — FENTANYL CITRATE 25 MCG: 50 INJECTION INTRAMUSCULAR; INTRAVENOUS at 18:23

## 2020-03-18 NOTE — PROGRESS NOTES
Problem: Falls - Risk of  Goal: *Absence of Falls  Description: Document Ronaldo Reas Fall Risk and appropriate interventions in the flowsheet. 3/18/2020 0830 by Shelton Cuellar RN  Outcome: Progressing Towards Goal  Note: Fall Risk Interventions:            Medication Interventions: Patient to call before getting OOB, Teach patient to arise slowly    Elimination Interventions: Urinal in reach, Patient to call for help with toileting needs, Call light in reach           3/18/2020 0830 by Shelton Cuellar RN  Outcome: Progressing Towards Goal  Note: Fall Risk Interventions:            Medication Interventions: Patient to call before getting OOB, Teach patient to arise slowly    Elimination Interventions: Urinal in reach, Patient to call for help with toileting needs, Call light in reach              Problem: Patient Education: Go to Patient Education Activity  Goal: Patient/Family Education  3/18/2020 0830 by Shelton Cuellar RN  Outcome: Progressing Towards Goal  3/18/2020 0830 by Shelton Cuellar RN  Outcome: Progressing Towards Goal     Problem: Pressure Injury - Risk of  Goal: *Prevention of pressure injury  Description: Document Rich Scale and appropriate interventions in the flowsheet. 3/18/2020 0830 by Shelton Cuellar RN  Outcome: Progressing Towards Goal  Note: Pressure Injury Interventions: Activity Interventions: Pressure redistribution bed/mattress(bed type)    Mobility Interventions: Pressure redistribution bed/mattress (bed type), PT/OT evaluation    Nutrition Interventions: Document food/fluid/supplement intake                  3/18/2020 0830 by Shelton Cuellar RN  Outcome: Progressing Towards Goal  Note: Pressure Injury Interventions:             Activity Interventions: Pressure redistribution bed/mattress(bed type)    Mobility Interventions: Pressure redistribution bed/mattress (bed type), PT/OT evaluation    Nutrition Interventions: Document food/fluid/supplement intake                     Problem: Patient Education: Go to Patient Education Activity  Goal: Patient/Family Education  3/18/2020 0830 by John Mckinnon RN  Outcome: Progressing Towards Goal  3/18/2020 0830 by John Mckinnon, RN  Outcome: Progressing Towards Goal     Problem: Pain  Goal: *Control of Pain  3/18/2020 0830 by John Mckinnon, RN  Outcome: Progressing Towards Goal  3/18/2020 0830 by John Mckinnon, RN  Outcome: Progressing Towards Goal     Problem: Cellulitis Care Plan (Adult)  Goal: *Control of acute pain  3/18/2020 0830 by John Mckinnon, RN  Outcome: Progressing Towards Goal  3/18/2020 0830 by John Mckinnon, RN  Outcome: Progressing Towards Goal  Goal: *Skin integrity maintained  3/18/2020 0830 by John Mckinnon, RN  Outcome: Progressing Towards Goal  3/18/2020 0830 by John Mckinnon, RN  Outcome: Progressing Towards Goal  Goal: *Absence of infection signs and symptoms  3/18/2020 0830 by John Mckinnon, RN  Outcome: Progressing Towards Goal  3/18/2020 0830 by John Mckinnon, RN  Outcome: Progressing Towards Goal

## 2020-03-18 NOTE — ANESTHESIA POSTPROCEDURE EVALUATION
Procedure(s):  INCISION AND DRAINAGE LEFT THIGH. general    Anesthesia Post Evaluation      Multimodal analgesia: multimodal analgesia used between 6 hours prior to anesthesia start to PACU discharge  Patient location during evaluation: bedside  Patient participation: complete - patient participated  Level of consciousness: awake  Pain management: adequate  Airway patency: patent  Anesthetic complications: no  Cardiovascular status: stable  Respiratory status: acceptable  Hydration status: acceptable  Post anesthesia nausea and vomiting:  controlled      Vitals Value Taken Time   /77 3/18/2020  6:25 PM   Temp 37.7 °C (99.9 °F) 3/18/2020  6:02 PM   Pulse 90 3/18/2020  6:40 PM   Resp 14 3/18/2020  6:40 PM   SpO2 93 % 3/18/2020  6:40 PM   Vitals shown include unvalidated device data.

## 2020-03-18 NOTE — PROGRESS NOTES
Progress Note        Assessment:    1. Status post LLE cellulitis with draining wound and fluid collection on CT (elongated fluid seen between the tensor fascia ashanti and vastus lateralis muscle)      PLAN:    1. NPO  2. Antibiotics per primary team  3. OR today for I&D of left thigh    [employer]  used, #[id number]       Operative procedure discussion: I personally discussed treatment options with the patient and any available family. We reviewed the details of the procedure, and risks of surgery to include complications of anesthesia, blood clots, infection which could involve further surgery including amputation, nerve and vessel injury. Patient and/or family agree to proceed with the recommended procedure and treatment plan. They verbally confirm that their questions have been answered. Written consent for procedure obtained and placed with the patient's chart. HPI: Nghia Bran is a 22 y.o. male patient without new orthopaedic changes. Blood pressure 122/71, pulse 97, temperature 98.7 °F (37.1 °C), resp. rate 18, height 4' 10\" (1.473 m), weight 77.1 kg (170 lb), SpO2 97 %. CBC w/Diff   Lab Results   Component Value Date/Time    WBC 12.6 03/18/2020 04:15 AM    RBC 4.57 (L) 03/18/2020 04:15 AM    HGB 13.9 03/18/2020 04:15 AM    HCT 41.8 03/18/2020 04:15 AM    MCV 91.5 03/18/2020 04:15 AM    MCH 30.4 03/18/2020 04:15 AM    MCHC 33.3 03/18/2020 04:15 AM    RDW 12.2 03/18/2020 04:15 AM    Lab Results   Component Value Date/Time    MONOS 10 03/18/2020 04:15 AM    EOS 1 03/18/2020 04:15 AM    BASOS 0 03/18/2020 04:15 AM    RDW 12.2 03/18/2020 04:15 AM             Physical Assessment:  General: in no apparent distress, well developed and well nourished, non-toxic, in no respiratory distress and acyanotic, alert and oriented times 3   Wound: open, erythema, induration, purulent, scant drainage   Extremities:  Neurovascular intact LLE.  Compartments soft and NT distal to surgical site. Plantar and dorsiflexion intact. Palpable DP/PT pulses noted. Denies paresthesias    Dressing:  none   DVT Exam:   No exam evidence to suggest DVT. Compartments soft and NT. Radiology:   EXAM: CT of the left lower extremity without IV contrast     INDICATION: Left thigh cellulitis for 5 days     COMPARISON: Left femur x-rays dated March 17, 2020     TECHNIQUE: Axial CT imaging of the left thigh was performed without intravenous  contrast. Multiplanar reformats were generated. One or more dose reduction  techniques were used on this CT: automated exposure control, adjustment of the  mAs and/or kVp according to patient size, and iterative reconstruction  techniques. The specific techniques used on this CT exam have been documented  in the patient's electronic medical record. Digital Imaging and Communications  in Medicine (DICOM) format image data are available to nonaffiliated external  healthcare facilities or entities on a secure, media free, reciprocally  searchable basis with patient authorization for at least a 12-month period after  this study.     _______________     FINDINGS:     Osseous structures: Visualized left SI joint is intact. Pubic symphysis is  intact. The bone mineralization is normal. There are no acute fractures or  subluxation. There is no periosteal reaction. No erosions seen to suggest  osteomyelitis.     Soft tissue windows: Soft tissues the pelvis demonstrate urinary bladder to be  under distended. There is no free fluid seen. There are enlarged left groin  lymph nodes measuring up to 12 mm, which may be reactive. There is also a 15 mm  left iliac chain lymph node which may also be reactive. There is facial fat  stranding along the left side the hip and circumferentially of the entire left  thigh suggesting superficial cellulitis.  There is also mild stranding of the  deep soft tissues of the fascial planes, with elongated fluid seen between the  tensor fascia ashanti and vastus lateralis muscle. There is no soft tissue gas. There is no knee joint effusion.     _______________     IMPRESSION  IMPRESSION:     No acute fractures or subluxation or erosion to suggest osteomyelitis.     There is superficial and deep soft tissue cellulitis of the left hip and thigh. No soft tissue gas seen to suggest gas-forming organisms.     Enlarged left iliac chain and groin lymph nodes which may be reactive.          - Concetta Sena, [address] [place] [address]  3/18/2020    Office [phone]

## 2020-03-18 NOTE — BRIEF OP NOTE
BRIEF OPERATIVE NOTE    Date of Procedure: 3/18/2020   Preoperative Diagnosis: Thigh abcess, LEFT  Postoperative Diagnosis: Thigh abcess, LEFT Deep subcutaneous tissue, fascia and muscle 36cm x 20cm  Procedure(s):  INCISION AND DRAINAGE LEFT THIGH Abcess with debridement of fascia, subcutaneous tissue and skin, VAC dressing placement 20cm x 8 cm    Surgeon(s) and Role:     Stephen Mathews MD - Primary         Surgical Assistant: Catalina Morrison    Surgical Staff:  Circ-1: Marshall Tran RN  Scrub Tech-1: Gonzalez Whipple  Surg Asst-1: Hill Singh  Event Time In   Incision Start 03/18/2020 1719   Incision Close 541     Anesthesia: General   Estimated Blood Loss: minimal  Specimens:   ID Type Source Tests Collected by Time Destination   1 : left thigh abscess Wound Thigh AEROBIC/ANAEROBIC CULTURE Gin Ruvalcaba MD 3/18/2020 1721 Microbiology      Findings: left thigh abcess [id number]  Complications: none  Implants: vac dressing

## 2020-03-18 NOTE — PROGRESS NOTES
Pharmacy Dosing Services: Vancomycin    Indication: Skin Soft Tissue Infection    Day of therapy: 0    Other Antimicrobials (Include dose, start day & day of therapy):  Clindamycin (Cleocin) and Piperacillin/Tazobactam (Zosyn)    Loading dose (date given): 2000 mg  Current Maintenance dose: 1250 mg IV every 12 hours    Goal Vancomycin Level: Vancomycin Trough: 15 - 20 mcg/mL (most infections)    Vancomycin Level (if drawn): No results for input(s): Oral Drier in the last 72 hours. Significant Cultures:   Results       Procedure Component Value Units Date/Time    CULTURE, Chris Reagin STAIN [[id number]]     Order Status:  Sent Specimen:  Wound from Thigh     CULTURE, BLOOD [[id number]] Collected:  20    Order Status:  Completed Specimen:  Blood Updated:  20    CULTURE, BLOOD [[id number]] Collected:  20    Order Status:  Completed Specimen:  Blood Updated:  20            Weight 77.1 kg (170 lb)  Recent Labs     20   CREA 1.01   BUN 8   WBC 12.8     Temp (72hrs), Av.7 °F (38.2 °C), Min:100.1 °F (37.8 °C), Max:101.3 °F (38.5 °C)    Estimated Creatinine Clearance Estimated Creatinine Clearance: 97.8 mL/min (based on SCr of 1.01 mg/dL). Estimated Creatinine Clearance (using IBW): 80.4 mL/min       CAPD, Hemodialysis or Renal Replacement Therapy: N/A    Renal function stable? (unstable defined as SCr increase of 0.5 mg/dL or > 50% increase from baseline, whichever is greater) (Y/N): n       Regimen assessment: new start   Maintenance dose: 1250 mg IV every 12 hours using ke of 0.92744, Vd of 53.97  Next scheduled level: 3/19 @0130       Pharmacy will follow daily and adjust medications as appropriate for renal function and/or serum levels.     Thank you,  Alex Nj  [phone]

## 2020-03-18 NOTE — H&P
History and Physical    Patient: Karyle Banner MRN: [id number]  SSN: [id number]    YOB: 1995  Age: 22 y.o. Sex: male      Subjective:      Karyle Banner is a 22 y.o. male who presents to [facility] ER with complaint of Swollen Left Leg. Patient states that he has had gradually worsening Left Thigh pain for approximately 5 days accompanied by swelling and drainage from a \"boil\" that form on the anterior aspect of this thigh. Patient reports fever, fatigue, weakness, and generally feelings of malaise accompanying thigh swelling. Patient states that he had taken Aleve for his pain. Patient denies chills, nausea, vomiting, diarrhea, dysuria, sick contacts, and previous skin infections. Furthermore, Patient denies previous medical diagnoses and surgeries. Notably, Patient's Primary language is Kyrgyz and translations were performed by an Interpretor over the phone (#[id number]). In [facility] ER, Patient noted to have Temperature of 101.3°F, WBC 12.8, and Heart Rate of 144 bpm.  CT scan shows  Superficial and Deep Cellulitis of the Left Thigh with concern for involvement of space between muscle and soft tissue. Orthopedic Surgical services contacted by [facility] ER Physician in [facility]. Compartment Syndrome was not suspected. HbA1c 6.0% in [facility] ER. Patient is placed on Observation for management of LLE Cellulitis with concern for Muscle Involvement. History reviewed. No pertinent past medical history. Patient denies previous medical diagnoses. History reviewed. No pertinent surgical history. Patient confirms no previous surgeries to his knowledge.     Family History   Problem Relation Age of Onset    No Known Problems Mother     No Known Problems Father      Social History     Tobacco Use    Smoking status: Never Smoker    Smokeless tobacco: Never Used   Substance Use Topics    Alcohol use: Yes      Prior to Admission medications    Not on File        No Known Allergies    Review of Systems:  (+) Fevers  (+) Fatigue  (+) Generalized Weakness  (-) Cough  (-) Increased Sputum Production  (-) Wheezing  (-) Shortness of Breath  (-) Chest Pain  (-) Abdominal Pain  (-) Nausea  (-) Vomiting  (-) Diarrhea  (-) Dysuria  All other systems have been reviewed and are negative      Objective:     Vitals:    03/17/20 2100 03/17/20 2102 03/17/20 2130 03/18/20 0408   BP: 123/68  119/72 106/63   Pulse: (!) 106  94 95   Resp:   18 16   Temp:  98.6 °F (37 °C) 98.8 °F (37.1 °C) 100 °F (37.8 °C)   SpO2:   99% 100%   Weight:       Height:            Physical Exam:  General:  Young Adult male lying in bed in no acute distress  HEENT:  Atraumatic, normocephalic; Pupils equally round and reactive to light with accommodation; Extraocular muscles intact; Moist Oropharynx without erythema, edema, or exudates  Neck:  No Bruits; No Lymphadenopathy  Chest:  No pectus carinatum; No pectus excavatum  Cardiovascular:  (+) Tachycardic rate, regular rhythm without rubs, gallops, or murmurs  Respiratory:  Clear to Auscultation Bilaterally without wheezes, rales, or rhonchi; normal effort of breathing  Abdominal:  Soft, non-tense, non-tender abdomen; BS present without guarding, rebound, or masses  :  Deferred  Extremities:  Pulses 2+ x4 without clubbing or cyanosis; (+) Moderate Erythema, Moderate to Severe Edema, increased warmth of Left Anterior Mid-Thigh with approximately 12 x 7 cm of induration surrounding sanguinous eschar on anterior mid-thigh  Musculoskeletal:  Strength 5/5 and symmetrical in BUE and BLE  Integument:  No rash on face, forearms, or legs  Neurological:  Alert and ostensibly Oriented; No gross deficits of Visual Acuity, Eye Movement, Jaw Opening, Facial Expression, Hearing, Phonation, or Head Movement; No gross deficits of Tongue Movement or Slurring of Speech  Psychiatric:  Affect is appropriate; Language is present and presumably fluent (Primarily Language is Vietnamese);  Behavior is appropriate      I have personally reviewed the CXR and have found, per my read, no obvious or significant radiological findings. Assessment:     Hospital Problems  Date Reviewed: 3/17/2020          Codes Class Noted POA    * (Principal) Sepsis ([facility] [address] ICD-10-CM: A41.9  ICD-9-CM: 038.9, 995.91  3/17/2020 Yes        Cellulitis of left thigh ICD-10-CM: L03.116  ICD-9-CM: 682.6  3/17/2020 Yes        Prediabetes ICD-10-CM: R73.03  ICD-9-CM: 790.29  3/18/2020 Yes    Overview Signed 3/18/2020  6:53 AM by Salvador CAAL DO     HbA1c 6.0% on 3/17/2020. Plan:     IV Vancomycin, IV Zosyn, and IV Clindamycin given in [facility] ER. Continue IV Vancomycin, IV Zosyn, IV fluids, NPO after Midnight, Telemetry, PRN IV Ondansetron, PRN Acetaminophen, IV Oxycodone-APAP, and IV Morphine. Orthopedic Surgical services consulted in [facility] ER. No significant home medications to continue. Monitor Serum Glucose; however, no POC Glucose checks planned at this time for Prediabetes. DVT mechanoprophylaxis due to anticipated surgical intervention.     Signed By: Jayson Joshua DO     March 18, 2020

## 2020-03-18 NOTE — PROGRESS NOTES
conducted an initial consultation and Spiritual Assessment for [facility], who is a 24 y.o.,male. Patients Primary Language is: Canadian. According to the patients EMR Orthodoxy Affiliation is: No preference. The reason the Patient came to the hospital is:   Patient Active Problem List    Diagnosis Date Noted    Prediabetes 03/18/2020    Sepsis ([address] 03/17/2020    Cellulitis of left thigh 03/17/2020        The  provided the following Interventions:   attempted to Initiate a relationship of care and support with patient in room 2204 this morning. Patient speaks no english but was still able to offer a prayer of sorts. Provided information about Spiritual Care Services. Offered prayer and assurance of continued prayers on patients behalf. .    The following outcomes were achieved:  Patient shared limited information about both their medical narrative and spiritual journey/beliefs. .    Assessment:  Patient does not have any Oriental orthodox/cultural needs that will affect patients preferences in health care. There are no further spiritual or Oriental orthodox issues which require Spiritual Care Services interventions at this time. Plan:  Chaplains will continue to follow and will provide pastoral care on an as needed/requested basis    . Adriana Geller   Spiritual Care   [phone]

## 2020-03-18 NOTE — PROGRESS NOTES
0745.Bedside and Verbal shift change report given to this nurse Starla Martin (oncoming nurse) by Addison Pittman (offgoing nurse). Report included the following information Tonya Garcia and [facility]     0830 Pt states his birthday is [birth year].    2000. Bedside and Verbal shift change report given to Ramona (oncoming nurse) by this nurse Starla Martin (offgoing nurse). Report included the following information SBAR, Kardex and MAR.

## 2020-03-18 NOTE — PROGRESS NOTES
NUTRITION INITIAL ASSESSMENT/PLAN OF CARE      RECOMMENDATIONS:   1. NPO; resume CC Diet when medically indicated and per Pt tolerance    2. Monitor labs, weight and PO intake  3. RD to follow     GOALS:   1. Diet resumed/PO intake meets >75% of protein/calorie needs by 3/21        ASSESSMENT:   Wt status is classified as overweight per Body mass index is 26.63 kg/m². Currently NPO for procedure but adequate PO intake prior. Labs noted. BG range (109-110) today; A1c (6.0%). Nutrition recommendations listed. RD to follow. Nutrition Diagnoses: Altered nutrition related lab values related to new prediabetes Dx with nutrition knowledge deficit as evidenced by A1c (6.0%) and Pt recall. SUBJECTIVE/OBJECTIVE:   Pt admitted for cellulitis of left thigh and sepsis. Ortho consulted: plan for I&D of left thigh today. Pt seen in room this afternoon awaiting procedure. Pt is Maori speaking so utilized blue phone;  ID# [id number]. Reports having a good appetite and normally consuming 3 meals per day at home. NKFA or problems chewing/swallowing and stable weight PTA;  lb per Pt. Pt stated he was feeling nauseous so informed RN, Modi , who provided Zofran. Discussed CHO choices and the plate method provided handouts for prediabetes information. Will continue to monitor and follow up to answer any questions. Information Obtained from:    [x] Chart Review   [x] Patient   [] Family/Caregiver   [x] Nurse/Physician   [] Interdisciplinary Meeting/Rounds      Diet: NPO  Medications: [x] Reviewed   IVF: NS @125 mL/hr   Humalog, Zosyn, Vancomycin  Allergies: [x] Reviewed   Encounter Diagnoses     ICD-10-CM ICD-9-CM   1. Cellulitis of left thigh L03.116 682.6   2. Abscess of left thigh L02.416 682.6     History reviewed. No pertinent past medical history.    Labs:    Lab Results   Component Value Date/Time    Sodium 140 03/18/2020 04:15 AM    Potassium 3.7 03/18/2020 04:15 AM    Chloride 105 03/18/2020 04:15 AM CO2 26 03/18/2020 04:15 AM    Anion gap 9 03/18/2020 04:15 AM    Glucose 96 03/18/2020 04:15 AM    BUN 8 03/18/2020 04:15 AM    Creatinine 0.78 03/18/2020 04:15 AM    Calcium 8.4 (L) 03/18/2020 04:15 AM    Albumin 2.4 (L) 03/18/2020 04:15 AM     Lab Results   Component Value Date/Time    GLU 96 03/18/2020 04:15 AM     (H) 03/17/2020 01:30 PM    GLUCPOC 110 03/18/2020 06:51 AM    GLUCPOC 109 03/18/2020 12:18 AM     No results found for: CHOL, CHOLPOCT, CHOLX, CHLST, CHOLV, HDL, HDLPOC, HDLP, LDL, LDLCPOC, LDLC, DLDLP, VLDLC, VLDL, TGLX, TRIGL, TRIGP, TGLPOCT, CHHD, CHHDX  Anthropometrics: BMI (calculated): 26.6  Last 3 Recorded Weights in this Encounter    03/17/20 1222   Weight: 77.1 kg (170 lb)      Ht Readings from Last 1 Encounters:   03/17/20 5' 7\" (1.702 m)       Documented Weight History:  Weight Metrics 3/17/2020   Weight 170 lb   BMI 26.63 kg/m2       No data found. IBW: 148 lb %IBW: 115% UBW: 170 lb   [] Weight Loss [] Weight Gain [x] Weight Stable per Pt    Estimated Nutrition Needs: [x] MSJ x 1.2-1.3  [] Other:  Calories: 9054-0645 kcal Based on:   [x] Actual BW    Protein:   62-77 g Based on:   [x] Actual BW x 0.8-1.0    Fluid:       1 mL/kcal      [x] No Cultural, Caodaism or ethnic dietary need identified.     [] Cultural, Caodaism and ethnic food preferences identified and addressed     Wt Status:  [] Normal (18.6 - 24.9) [] Underweight (<18.5) [x] Overweight (25 - 29.9) [] Mild Obesity (30 - 34.9)  [] Moderate Obesity (35 - 39.9) [] Morbid Obesity (40+)       Nutrition Problems Identified:   [x] Suboptimal PO intake v/s NPO  [] Food Allergies  [] Difficulty chewing/swallowing/poor dentition  [] Constipation/Diarrhea   [x] Nausea/Vomiting (PRN Zofran)  [] None  [] Other:     Plan:   [x] Therapeutic Diet  []  Obtained/adjusted food preferences/tolerances and/or snacks options   []  Supplements added   [] Occupational therapy following for feeding techniques  []  HS snack added   []  Modify diet texture   []  Modify diet for food allergies   [x]  Educate patient (discussed types of CHO and provided handouts for plate method and prediabetes)  []  Assist with menu selection   [x]  Monitor PO intake on meal rounds   [x]  Continue inpatient monitoring and intervention   [x]  Participated in discharge planning/Interdisciplinary rounds/Team meetings   []  Other:     Education Needs:   [x] Not appropriate for teaching at this time due to: NPO   [] Identified and addressed    Nutrition Monitoring and Evaluation:  [x] Continue ongoing monitoring and intervention  [] Other    Jed Ham

## 2020-03-18 NOTE — PROGRESS NOTES
Problem: Discharge Planning  Goal: *Discharge to safe environment  Outcome: Resolved/Met   Plan home,cm to follow for     Reason for Admission:   cellulitis                   RUR Score:     none                Plan for utilizing home health:     possible     PCP: First and Last name:  None, referral to    Name of Practice:    Are you a current patient: Yes/No:    Approximate date of last visit:                     Current Advanced Directive/Advance Care Plan:  none                         Transition of Care Plan:    Spoke with pt, lives with a group of friends. His famly are in [place]. He designates his boss and friend for dcp. His friend hannah padma transport home. He is independent with adls and amb. Does not follow with a pcp. Referral to . Uninsured. Pt will not be able to get jory, he is not here legally. If pt needs wd care his friend Katerina Vail can assist him. [facility] may be able to see for 1-2 visits to teach wd care. Cm to review any meds and will assist as able. Plan home to address on face sheet. Patient has designated ______friend/boss__________________ to participate in his/her discharge plan and to receive any needed information. Name: Anders gallegos/ hannah hidalgo/[phone]  Address:  Phone number: [phone]    Patient and/or next of kin has been given the Outpatient Observation Information and Notification letter and all questions answered. Care Management Interventions  PCP Verified by CM: Yes  Palliative Care Criteria Met (RRAT>21 & CHF Dx)?: No  Mode of Transport at Discharge: Other (see comment)  Transition of Care Consult (CM Consult): Discharge Planning  Discharge Durable Medical Equipment: No  Physical Therapy Consult: No  Occupational Therapy Consult: No  Speech Therapy Consult: No  Current Support Network:  Other  Confirm Follow Up Transport: Other (see comment)  Discharge Location  Discharge Placement: Home

## 2020-03-18 NOTE — ANESTHESIA PREPROCEDURE EVALUATION
Relevant Problems   No relevant active problems       Anesthetic History               Review of Systems / Medical History  Patient summary reviewed and nursing notes reviewed    Pulmonary                   Neuro/Psych              Cardiovascular                       GI/Hepatic/Renal                Endo/Other             Other Findings                   Anesthesia Plan

## 2020-03-18 NOTE — PROGRESS NOTES
2220- Pt received in bed after having been brought fro ED. Charge nurse Peggy Quintero at bedside taking vitals and hung IVF. pt has swelling to right thigh- small open wound noted BRIELLE. Thigh tender and warm  to touch. Pt Ecuadorean speaking, speaks little [place]. Blue phone at bedside. Assessment completed plan of care for the shift explained pt verbalized understanding. Call light, phone and urinal within reach. 12- Dr Becki Patiño at bedside seeing pt. 0130- Pt sleeping. 0230- Pt sleeping ivf infusing well. 0430- Pt remained sleepy during the night unable to complete the required documentation. Noted order for wound culture but did not see any wound drainage. 0745-Bedside and Verbal shift change report given to MITCHEL Ferrera (oncoming nurse) by John Ward RN (offgoing nurse). Report included the following information SBAR, Kardex, Intake/Output, MAR and Recent Results. need for outpatient follow-up/return to ED if symptoms worsen, persist or questions arise

## 2020-03-18 NOTE — ROUTINE PROCESS
Accepted patient from ED. Able to ambulate from wheelchair to bed. Alert and oriented times four. Syrian speaking. Denies pain and discomfort at this time. Blue phone at bedside. Patient states that he has not prior to admission medications. Noted HgbA1C of 6.0. New orders for accuchecks every six hours.

## 2020-03-19 PROBLEM — L02.419 THIGH ABSCESS: Status: ACTIVE | Noted: 2020-03-19

## 2020-03-19 PROBLEM — L02.416 ABSCESS OF LEFT THIGH: Status: ACTIVE | Noted: 2020-03-19

## 2020-03-19 LAB
ANION GAP SERPL CALC-SCNC: 10 MMOL/L (ref 3–18)
ANION GAP SERPL CALC-SCNC: 7 MMOL/L (ref 3–18)
BASOPHILS # BLD: 0 K/UL (ref 0–0.1)
BASOPHILS NFR BLD: 0 % (ref 0–2)
BUN SERPL-MCNC: 10 MG/DL (ref 7–18)
BUN SERPL-MCNC: 12 MG/DL (ref 7–18)
BUN/CREAT SERPL: 14 (ref 12–20)
BUN/CREAT SERPL: 15 (ref 12–20)
CALCIUM SERPL-MCNC: 7.6 MG/DL (ref 8.5–10.1)
CALCIUM SERPL-MCNC: 7.7 MG/DL (ref 8.5–10.1)
CHLORIDE SERPL-SCNC: 104 MMOL/L (ref 100–111)
CHLORIDE SERPL-SCNC: 107 MMOL/L (ref 100–111)
CO2 SERPL-SCNC: 23 MMOL/L (ref 21–32)
CO2 SERPL-SCNC: 26 MMOL/L (ref 21–32)
CREAT SERPL-MCNC: 0.66 MG/DL (ref 0.6–1.3)
CREAT SERPL-MCNC: 0.83 MG/DL (ref 0.6–1.3)
DATE LAST DOSE: ABNORMAL
DIFFERENTIAL METHOD BLD: ABNORMAL
EOSINOPHIL # BLD: 0 K/UL (ref 0–0.4)
EOSINOPHIL NFR BLD: 0 % (ref 0–5)
ERYTHROCYTE [DISTWIDTH] IN BLOOD BY AUTOMATED COUNT: 12.2 % (ref 11.6–14.5)
GLUCOSE BLD STRIP.AUTO-MCNC: 107 MG/DL (ref 70–110)
GLUCOSE BLD STRIP.AUTO-MCNC: 132 MG/DL (ref 70–110)
GLUCOSE BLD STRIP.AUTO-MCNC: 137 MG/DL (ref 70–110)
GLUCOSE BLD STRIP.AUTO-MCNC: 137 MG/DL (ref 70–110)
GLUCOSE BLD STRIP.AUTO-MCNC: 139 MG/DL (ref 70–110)
GLUCOSE SERPL-MCNC: 137 MG/DL (ref 74–99)
GLUCOSE SERPL-MCNC: 165 MG/DL (ref 74–99)
HCT VFR BLD AUTO: 38.8 % (ref 36–48)
HGB BLD-MCNC: 13.1 G/DL (ref 13–16)
INR PPP: 1.3 (ref 0.8–1.2)
LYMPHOCYTES # BLD: 2.1 K/UL (ref 0.9–3.6)
LYMPHOCYTES NFR BLD: 11 % (ref 21–52)
MCH RBC QN AUTO: 30.5 PG (ref 24–34)
MCHC RBC AUTO-ENTMCNC: 33.8 G/DL (ref 31–37)
MCV RBC AUTO: 90.4 FL (ref 74–97)
MONOCYTES # BLD: 0.5 K/UL (ref 0.05–1.2)
MONOCYTES NFR BLD: 2 % (ref 3–10)
NEUTS SEG # BLD: 17.5 K/UL (ref 1.8–8)
NEUTS SEG NFR BLD: 87 % (ref 40–73)
PLATELET # BLD AUTO: 280 K/UL (ref 135–420)
PMV BLD AUTO: 10.5 FL (ref 9.2–11.8)
POTASSIUM SERPL-SCNC: 3.5 MMOL/L (ref 3.5–5.5)
POTASSIUM SERPL-SCNC: 3.9 MMOL/L (ref 3.5–5.5)
PROTHROMBIN TIME: 16 SEC (ref 11.5–15.2)
RBC # BLD AUTO: 4.29 M/UL (ref 4.7–5.5)
REPORTED DOSE,DOSE: ABNORMAL UNITS
REPORTED DOSE/TIME,TMG: ABNORMAL
SODIUM SERPL-SCNC: 137 MMOL/L (ref 136–145)
SODIUM SERPL-SCNC: 140 MMOL/L (ref 136–145)
VANCOMYCIN TROUGH SERPL-MCNC: 0.8 UG/ML (ref 10–20)
WBC # BLD AUTO: 20.1 K/UL (ref 4.6–13.2)

## 2020-03-19 PROCEDURE — 99218 HC RM OBSERVATION: CPT

## 2020-03-19 PROCEDURE — 80048 BASIC METABOLIC PNL TOTAL CA: CPT

## 2020-03-19 PROCEDURE — 82962 GLUCOSE BLOOD TEST: CPT

## 2020-03-19 PROCEDURE — 80202 ASSAY OF VANCOMYCIN: CPT

## 2020-03-19 PROCEDURE — 97535 SELF CARE MNGMENT TRAINING: CPT

## 2020-03-19 PROCEDURE — 97166 OT EVAL MOD COMPLEX 45 MIN: CPT

## 2020-03-19 PROCEDURE — 85025 COMPLETE CBC W/AUTO DIFF WBC: CPT

## 2020-03-19 PROCEDURE — 36415 COLL VENOUS BLD VENIPUNCTURE: CPT

## 2020-03-19 PROCEDURE — 74011250636 HC RX REV CODE- 250/636: Performed by: ORTHOPAEDIC SURGERY

## 2020-03-19 PROCEDURE — 86900 BLOOD TYPING SEROLOGIC ABO: CPT

## 2020-03-19 PROCEDURE — 74011000258 HC RX REV CODE- 258: Performed by: ORTHOPAEDIC SURGERY

## 2020-03-19 PROCEDURE — 97162 PT EVAL MOD COMPLEX 30 MIN: CPT

## 2020-03-19 PROCEDURE — 74011250636 HC RX REV CODE- 250/636: Performed by: HOSPITALIST

## 2020-03-19 PROCEDURE — 74011250636 HC RX REV CODE- 250/636: Performed by: PHYSICIAN ASSISTANT

## 2020-03-19 PROCEDURE — 65270000029 HC RM PRIVATE

## 2020-03-19 PROCEDURE — 85610 PROTHROMBIN TIME: CPT

## 2020-03-19 PROCEDURE — 96366 THER/PROPH/DIAG IV INF ADDON: CPT

## 2020-03-19 PROCEDURE — 74011250637 HC RX REV CODE- 250/637: Performed by: ORTHOPAEDIC SURGERY

## 2020-03-19 RX ORDER — SODIUM CHLORIDE 9 MG/ML
50 INJECTION, SOLUTION INTRAVENOUS CONTINUOUS
Status: DISCONTINUED | OUTPATIENT
Start: 2020-03-19 | End: 2020-03-23 | Stop reason: HOSPADM

## 2020-03-19 RX ORDER — SODIUM CHLORIDE 9 MG/ML
250 INJECTION, SOLUTION INTRAVENOUS AS NEEDED
Status: DISCONTINUED | OUTPATIENT
Start: 2020-03-19 | End: 2020-03-23 | Stop reason: HOSPADM

## 2020-03-19 RX ADMIN — Medication 10 ML: at 14:00

## 2020-03-19 RX ADMIN — SODIUM CHLORIDE 1250 MG: 900 INJECTION, SOLUTION INTRAVENOUS at 02:12

## 2020-03-19 RX ADMIN — SODIUM CHLORIDE 1250 MG: 900 INJECTION, SOLUTION INTRAVENOUS at 16:00

## 2020-03-19 RX ADMIN — SODIUM CHLORIDE 50 ML/HR: 900 INJECTION, SOLUTION INTRAVENOUS at 15:00

## 2020-03-19 RX ADMIN — OXYCODONE HYDROCHLORIDE AND ACETAMINOPHEN 1 TABLET: 5; 325 TABLET ORAL at 23:01

## 2020-03-19 RX ADMIN — PIPERACILLIN AND TAZOBACTAM 3.38 G: 3; .375 INJECTION, POWDER, LYOPHILIZED, FOR SOLUTION INTRAVENOUS at 18:32

## 2020-03-19 RX ADMIN — Medication 10 ML: at 01:05

## 2020-03-19 RX ADMIN — SODIUM CHLORIDE 50 ML/HR: 900 INJECTION, SOLUTION INTRAVENOUS at 19:48

## 2020-03-19 RX ADMIN — SODIUM CHLORIDE 1250 MG: 900 INJECTION, SOLUTION INTRAVENOUS at 09:11

## 2020-03-19 RX ADMIN — PIPERACILLIN AND TAZOBACTAM 3.38 G: 3; .375 INJECTION, POWDER, LYOPHILIZED, FOR SOLUTION INTRAVENOUS at 02:13

## 2020-03-19 RX ADMIN — OXYCODONE HYDROCHLORIDE AND ACETAMINOPHEN 1 TABLET: 5; 325 TABLET ORAL at 12:39

## 2020-03-19 RX ADMIN — SODIUM CHLORIDE 125 ML/HR: 900 INJECTION, SOLUTION INTRAVENOUS at 02:16

## 2020-03-19 RX ADMIN — PIPERACILLIN AND TAZOBACTAM 3.38 G: 3; .375 INJECTION, POWDER, LYOPHILIZED, FOR SOLUTION INTRAVENOUS at 11:08

## 2020-03-19 RX ADMIN — Medication 10 ML: at 22:00

## 2020-03-19 RX ADMIN — Medication 10 ML: at 06:31

## 2020-03-19 RX ADMIN — OXYCODONE HYDROCHLORIDE AND ACETAMINOPHEN 1 TABLET: 5; 325 TABLET ORAL at 01:17

## 2020-03-19 RX ADMIN — OXYCODONE HYDROCHLORIDE AND ACETAMINOPHEN 1 TABLET: 5; 325 TABLET ORAL at 17:03

## 2020-03-19 NOTE — PROGRESS NOTES
Problem: Self Care Deficits Care Plan (Adult)  Goal: *Acute Goals and Plan of Care (Insert Text)  Description: Occupational Therapy Goals  Initiated 3/19/2020 within 7 day(s). 1.  Patient will perform grooming with modified independence. 2.  Patient will perform bathing with modified independence. 3.  Patient will perform lower body dressing with modified independence. 4.  Patient will perform toilet transfers with modified independence while adhering to LLE PWB. 5.  Patient will perform all aspects of toileting with modified independence. 6.  Patient will participate in upper extremity therapeutic exercise/activities with modified independence for 15 minutes. 7.  Patient will utilize energy conservation techniques during functional activities with verbal cues. Prior Level of Function: (I) w/ ADLs and functional mobility w/o AD   Outcome: Progressing Towards Goal   OCCUPATIONAL THERAPY EVALUATION    Patient: Leldon Hodgkins (8 y.o. male)  Date: 3/19/2020  Primary Diagnosis: Cellulitis of left thigh [L03.116]  Sepsis ([facility] [address] [A41.9]  Procedure(s) (LRB):  INCISION AND DRAINAGE LEFT THIGH (Left) 1 Day Post-Op   Precautions:   Fall, PWB(LLE)  PLOF: see above    ASSESSMENT :  Nursing/Maisha cleared for pt to participate in OT evaluation and tx session. Based on the objective data described below, the patient presents with strength BUE MMT 5/5, BUE AROM WFL, good functional activity tolerance , Balance: sitting static good, sitting dynamic Fair+, standing static good, standing dynamic fair+, fair safety awareness and pain L thigh 2/10, which is inhibiting ability to perform ADLs and functional transfers independently. Based upon clinical judgement, patient requires assist with functional mobility e.g. bedside commode transfer: Supv using RW, UB bathing with set up, LB bathing with Min A, UB dress with Mod I, LB dress with Min A, toileting with supv, grooming with set up and self feeding with (I). Patient educated on role of OT and POC; patient verbalized understanding. Pt. Instructed on safety awareness with importance to utilize call bell to request assist with functional transfers to prevent falls, patient verbalized understanding and provided accurate demonstration. Dry erase communication board updated for accuracy w/ carryover for bedside commode transfers using RW with 1 person assist.     Patient will benefit from skilled intervention to address the above impairments. Patient's rehabilitation potential is considered to be Excellent  Factors which may influence rehabilitation potential include:   [x]             None noted  []             Mental ability/status  []             Medical condition  []             Home/family situation and support systems  []             Safety awareness  []             Pain tolerance/management  []             Other:      PLAN :  Recommendations and Planned Interventions:   [x]               Self Care Training                  [x]      Therapeutic Activities  [x]               Functional Mobility Training   []      Cognitive Retraining  [x]               Therapeutic Exercises           []      Endurance Activities  [x]               Balance Training                    []      Neuromuscular Re-Education  []               Visual/Perceptual Training     [x]      Home Safety Training  [x]               Patient Education                   [x]      Family Training/Education  []               Other (comment):    Frequency/Duration: Patient will be followed by occupational therapy 1-2 times per day/4-7 days per week to address goals. Discharge Recommendations: Home Health  Further Equipment Recommendations for Discharge: bedside commode and rolling walker     SUBJECTIVE:   Patient stated I don't speech much english, can we use the blue phone for the intepreter?     OBJECTIVE DATA SUMMARY:   History reviewed. No pertinent past medical history. History reviewed.  No pertinent surgical history. Barriers to Learning/Limitations: None  Compensate with: visual, verbal, tactile, kinesthetic cues/model    Home Situation:   Home Situation  Home Environment: Apartment  # Steps to Enter: 12  Rails to Enter: No  One/Two Story Residence: Two story(2nd floor apt)  Living Alone: No  Support Systems: Spouse/Significant Other/Partner  Patient Expects to be Discharged to[de-identified] Apartment  Current DME Used/Available at Home: Grab bars  Tub or Shower Type: Tub/Shower combination  [x]  Right hand dominant   []  Left hand dominant    Cognitive/Behavioral Status:  Neurologic State: Alert  Orientation Level: Oriented to person  Cognition: Follows commands  Safety/Judgement: Fall prevention    Skin: L thigh s/p sx  Edema: L thigh s/p sx    Vision/Perceptual:  appears intact    Coordination: BUE  Coordination: Within functional limits(BUEs)  Fine Motor Skills-Upper: Left Intact; Right Intact    Gross Motor Skills-Upper: Left Intact; Right Intact    Balance:  Sitting: Impaired  Sitting - Static: Good (unsupported)  Sitting - Dynamic: Fair (occasional)(+)  Standing: Impaired  Standing - Static: Good  Standing - Dynamic : Fair(+)    Strength: BUE  Strength:  Within functional limits(BUEs 5/5)    Tone & Sensation: BUE  Tone: Normal(BUEs)  Sensation: Intact(BUEs)     Range of Motion: BUEs  AROM: Within functional limits(BUEs)     Functional Mobility and Transfers for ADLs:  Bed Mobility:     Supine to Sit: Supervision        Transfers:  Sit to Stand: Supervision  Stand to Sit: Supervision     Bed to Chair: Supervision          Toilet Transfer : Supervision      ADL Assessment:   Feeding: Independent    Oral Facial Hygiene/Grooming: Setup    Bathing: Minimum assistance    Upper Body Dressing: Modified independent    Lower Body Dressing: Minimum assistance    Toileting: Supervision      Cognitive Retraining  Safety/Judgement: Fall prevention    Pain:  Pain level pre-treatment: 2/10   Pain level post-treatment: 2/10   Pain Intervention(s): Medication (see MAR); Rest, Ice, Repositioning   Response to intervention: Nurse notified, See doc flow    Activity Tolerance:   fair  Please refer to the flowsheet for vital signs taken during this treatment. After treatment:   [x] Patient left in no apparent distress sitting up in chair  [] Patient left in no apparent distress in bed  [x] Call bell left within reach  [x] Nursing notified  [] Caregiver present  [] Bed alarm activated    COMMUNICATION/EDUCATION:   [x] Role of Occupational Therapy in the acute care setting  [x] Home safety education was provided and the patient/caregiver indicated understanding. [x] Patient/family have participated as able in goal setting and plan of care. [x] Patient/family agree to work toward stated goals and plan of care. [] Patient understands intent and goals of therapy, but is neutral about his/her participation. [] Patient is unable to participate in goal setting and plan of care. Thank you for this referral.  Bernardino Lake  Time Calculation: 30 mins    Eval Complexity: History: MEDIUM Complexity : Expanded review of history including physical, cognitive and psychosocial  history ; Examination: MEDIUM Complexity : 3-5 performance deficits relating to physical, cognitive , or psychosocial skils that result in activity limitations and / or participation restrictions; Decision Making:MEDIUM Complexity : Patient may present with comorbidities that affect occupational performnce.  Miniml to moderate modification of tasks or assistance (eg, physical or verbal ) with assesment(s) is necessary to enable patient to complete evaluation

## 2020-03-19 NOTE — PROGRESS NOTES
If pt is to go home with wd vac will need measurements and script signed, will place script on chart in nurses station. Pt will have to meet romeo requirements of [facility], [address] [place] [address], before it would be approved. Pt is uninsured and will have limited hh visits. If needs iv antibxs will have to be daily dosing and he will have to go to out pt infusion center here and in [place].

## 2020-03-19 NOTE — PHYSICIAN ADVISORY
Letter of Admission Status Determination: Upgrade to Inpatient Manjinder Bell was originally hospitalized as Outpatient Observation status on 3/17/2020. Ongoing hospitalization was warranted for this patient with severe cellulitis and abscess requiring surgical intervention, IV antibiotics, further evaluaiton and close clinical monitoring. Based on the documented clinical condition and care plan, we recommend upgrading patient's hospitalization status to INPATIENT.  
  
The final decision regarding the patient's hospitalization status depends on the attending physician's judgment. Jackie Burgess MD, PALMIRA, FACP, [facility], CHCQM-PHYADV Physician Advisor [facility] [phone]

## 2020-03-19 NOTE — PROGRESS NOTES
Progress Note      Post Operative Day: 1    Assessment:    1. Status post   1. Incision and drainage of the left thigh abscess with debridement of the fascia, subcutaneous tissue, and skin. 2. VAC dressing placement 20 cm x 8 cm wound. PLAN:    1. Plans for repeat I&D tomorrow. NPO past midnight tonight. Case discussed with attendings           HPI: Cristobal Mc is a 26 y.o. male patient without new complaints status post I&D for Cellulitis of left thigh [J04.275]  Sepsis ([facility] [address] [A41.9] 3/17/2020. No new orthopaedic changes. Blood pressure 104/66, pulse 64, temperature 97.8 °F (36.6 °C), resp. rate 18, height 5' 7\" (1.702 m), weight 82.2 kg (181 lb 3.2 oz), SpO2 98 %. CBC w/Diff   Lab Results   Component Value Date/Time    WBC 12.6 03/18/2020 04:15 AM    RBC 4.57 (L) 03/18/2020 04:15 AM    HCT 41.8 03/18/2020 04:15 AM          Physical Assessment:  General: in no apparent distress   Extremities:  Neurovascular intact    Dressing:  wound vac in place. Erythema    DVT Exam:   No exam evidence to suggest DVT.  Compartments soft and NT.               - Yeny Whittington PA-C  3/19/2020  Office [phone]  Cell [phone]

## 2020-03-19 NOTE — OP NOTES
[facility]  OPERATIVE REPORT    Name:  Tatum Johns  MR#:   [id number]  :  1995  ACCOUNT #:  [de-identified]  DATE OF SERVICE:  2020    PREOPERATIVE DIAGNOSIS:  Left thigh abscess. POSTOPERATIVE DIAGNOSIS:  Left thigh abscess, deep subcutaneous tissue, fascia, and muscle 36 cm x 20 cm. PROCEDURE PERFORMED:  1. Incision and drainage of the left thigh abscess with debridement of the fascia, subcutaneous tissue, and skin. Non-excisional debridement  brushing, irrigating, scrubbing or washing of devitalized tissue, necrosis or slough with Minor removal of loose fragments, scraping, whirlpool, mechanical or pulsatile lavage, irrigation, utilized curette, pulse lavage    2. VAC dressing placement 20 cm x 8 cm wound. SURGEON:  Twan Hardwick MD    SURGICAL ASSISTANT:  Kindra Tovar, assisted with surgery and closure and VAC placement. INCISION START:  05:19. INCISION CLOSED:  05:41. ANESTHESIA:  General.    COMPLICATIONS:  None. SPECIMENS REMOVED:  Left thigh abscess wound cultures, aerobic and anaerobic. IMPLANTS:  VAC dressing. ESTIMATED BLOOD LOSS:  Minimal.    INDICATION FOR THE PROCEDURE:  The patient is a 26-year-old male with a history of developing left thigh abscess over the past 5-6 days, brought to the operating suite for definitive management. This was evaluated by a CT scan and found to have large abscess from prior abrasion. Risks and benefits were explained to him in full including but not limited to bleeding, infection, neurovascular damage, pain, stiffness, swelling, need for further surgery, revision surgery, incomplete resolution of symptoms, repeat surgery. PROCEDURE:  The patient was brought to the operative suite, placed in the supine position, intubated with general endotracheal anesthesia.   We proceeded to prep and drape his left leg in normal sterile fashion and was secured in the beanbag and all prominent points were padded. We proceeded to identify abrasion on the anterolateral aspect of his thigh. Fluctuance was appreciated about the entire lateral and anterior aspect of his thigh. The incision was made directly over the area of the abrasion. This incision was approximately 3 inches in length. Massive purulent filled abscess appreciated with fibrinous tissue with necrotic deep tissue, fascia and subcutaneous tissue. This incision was extended distally for a total incision length of approximately 6 to 7 inches. The abscess itself extended approximately 30 cm x 36 cm x 20 cm overall from his proximal thigh anteriorly to the posterior mid and distal thigh as well as residual anterior aspect of the thigh all the way down to the aspect of the IT band laterally. A significant amount of purulence was appreciated. Abscess was found to track slightly anterior, did not appear to invade the knee joint, nor it migrated proximally up into the hip. After this was done, we proceeded to copiously irrigate and debride any soft tissue of the skin, subcutaneous tissue, and fascia. Any fibrinous tissue was debrided as well. We proceeded to irrigate with 3 L of pulsatile lavage with bacitracin. This completely cleaned out the abscess itself. After this was done, we proceeded to place a sponge from the [facility] dressing measuring 20 cm x 8 cm. This was packed into the wound. The distal aspect of the wound was sewn over this with a 2-0 nylon stitch in an interrupted figure-of-eight fashion. This was covered over with a VAC dressing itself. The VAC tube was placed and adequate suction was appreciated. He was placed in the knee immobilizer after being placed on a dry sterile dressing and Ace wrap. He was awoken from anesthesia. He was brought to the postop care in stable condition. Of note, cultures were taken during the case, both aerobic and anaerobic.       MD FAUZIA [email or web address]  D:  03/18/2020 17:46  T: 03/19/2020 4:45  JOB #:  [id number]

## 2020-03-19 NOTE — PROGRESS NOTES
Problem: Mobility Impaired (Adult and Pediatric)  Goal: *Acute Goals and Plan of Care (Insert Text)  Description: Physical Therapy Goals  Initiated 3/19/2020 and to be accomplished within 7 day(s)  1. Patient will move from supine to sit and sit to supine in bed with modified independence. 2.  Patient will transfer from bed to chair and chair to bed with modified independence using the least restrictive device. 3.  Patient will perform sit to stand with modified independence. 4.  Patient will ambulate with modified independence for 50 feet with the least restrictive device. 5.  Patient will ascend/descend 6 stairs with handrail(s) with modified independence. 3/19/2020 1032 by Carlo Spivey PT  Outcome: Progressing Towards Goal    PHYSICAL THERAPY EVALUATION    Patient: Mary Aguila (78 y.o. male)  Date: 3/19/2020  Primary Diagnosis: Cellulitis of left thigh [Y62.365]  Sepsis ([facility] [address] [A41.9]  Procedure(s) (LRB):  INCISION AND DRAINAGE LEFT THIGH (Left) 1 Day Post-Op   Precautions: Fall  PWB LLE  PLOF: Independent  ASSESSMENT :  PWB LLE s/p wound vac placement. Need clarification on specifics of PWB LLE. Knee immobilizer on patient upon entry; remained donned during session. Supervision for supine to sit and sit to stand d/t lines. Used ww for BUE support to off weight LLE. Supervision for bed to chair transfer d/t lines. Seated in chair with BLE elevated. RN Maisha aware. Educated on need for RN assistance with mobility; verbalized understanding. Call bell in reach. Patient will benefit from skilled intervention to address the above impairments.   Patient's rehabilitation potential is considered to be Good  Factors which may influence rehabilitation potential include:   []         None noted  []         Mental ability/status  [x]         Medical condition  []         Home/family situation and support systems  []         Safety awareness  []         Pain tolerance/management  [] Other: PLAN :  Recommendations and Planned Interventions:   [x]           Bed Mobility Training             [x]    Neuromuscular Re-Education  [x]           Transfer Training                   []    Orthotic/Prosthetic Training  [x]           Gait Training                          []    Modalities  [x]           Therapeutic Exercises           []    Edema Management/Control  [x]           Therapeutic Activities            [x]    Family Training/Education  [x]           Patient Education  []           Other (comment):    Frequency/Duration: Patient will be followed by physical therapy 3-5 times a week to address goals. Discharge Recommendations: Home Health and Outpatient  Further Equipment Recommendations for Discharge: rolling walker     SUBJECTIVE:   Patient stated A 2.    OBJECTIVE DATA SUMMARY:   History reviewed. No pertinent past medical history. History reviewed. No pertinent surgical history.   Barriers to Learning/Limitations: yes;  language  Compensate with: Visual Cues, Verbal Cues, Tactile Cues and Kinesthetic Cues    Home Situation:  Home Situation  Home Environment: Private residence  One/Two Story Residence: One story  Living Alone: No  Support Systems: Family member(s)  Patient Expects to be Discharged to[de-identified] Private residence  Current DME Used/Available at Home: None    Critical Behavior:  Neurologic State: Alert  Orientation Level: Oriented to person  Cognition: Follows commands     Psychosocial  Patient Behaviors: Cooperative    Strength:    Manual Muscle Testing (LE)         R     L    Hip Flexion:   4+/5  3+/5  Knee EXT:   4+/5    Knee FLEX:   4+/5    Ankle DF:   4+/5  3+/5  _________________________________________________   Range Of Motion:  LLE limited by knee immobilizer  RLE WFL AROM   Functional Mobility:  Bed Mobility:  Supine to Sit: Supervision  Transfers:  Sit to Stand: Supervision  Stand to Sit: Supervision  Bed to Chair: Supervision  Balance:   Sitting: Intact  Standing: Impaired  Standing - Static: Good  Standing - Dynamic : Good    Pain:  Pain level pre-treatment: 2/10   Pain level post-treatment: 2/10     Activity Tolerance:   Good    After treatment:   [x]         Patient left in no apparent distress sitting up in chair  []         Patient left in no apparent distress in bed  [x]         Call bell left within reach  [x]         Nursing notified  []         Caregiver present  []         Bed alarm activated  []         SCDs applied    COMMUNICATION/EDUCATION:   [x]         Role of physical therapy and plan of care in the acute care setting. [x]         Fall prevention education was provided and the patient/caregiver indicated understanding. [x]         Patient/family have participated as able in goal setting and plan of care. []         Patient/family agree to work toward stated goals and plan of care. []         Patient understands intent and goals of therapy, but is neutral about his/her participation. []         Patient is unable to participate in goal setting/plan of care: ongoing with therapy staff.     Thank you for this referral.  Tima Hood, PT   Time Calculation: 12 mins    Eval Complexity: History: MEDIUM  Complexity : 1-2 comorbidities / personal factors will impact the outcome/ POC Exam:MEDIUM Complexity : 3 Standardized tests and measures addressing body structure, function, activity limitation and / or participation in recreation  Presentation: MEDIUM Complexity : Evolving with changing characteristics  Clinical Decision Making:Medium Complexity    linical judgement; ROM, MMT, functional mobility Overall Complexity:MEDIUM

## 2020-03-19 NOTE — PROGRESS NOTES
Pt is s/p Incision and drainage of the left thigh abscess with debridement of the fascia, subcutaneous tissue, and skin. With [facility] dressing placement 20 cm x 8 cm wound on (3/18). Pt seen in room OOB in chair with wound vac in place. Feeling better today. Denies any N//V/ABD pain. Reports appetite is good and he has a menu to implement preferences with dietary. No questions at this time. Will continue to monitor.

## 2020-03-19 NOTE — PROGRESS NOTES
Late Entry. Patient seen and managed 3/18/20. Note entered as late entry. Progress Note      Patient: Garima Warren               Sex: male          DOA: 3/17/2020       YOB: 1995      Age:  26 y.o.        LOS:  LOS: 0 days             CHIEF COMPLAINT:  Thigh abscess    Subjective:     Patient tolerated surgical debridement well  Stable in PACU    Objective:      Visit Vitals  /67 (BP 1 Location: Left arm, BP Patient Position: Sitting)   Pulse 90   Temp 97.9 °F (36.6 °C)   Resp 18   Ht 5' 7\" (1.702 m)   Wt 82.2 kg (181 lb 3.2 oz)   SpO2 99%   BMI 28.38 kg/m²       Physical Exam:  Gen:  No distress, no complaint  Lungs:  Clear bilaterally, no wheeze or rhonchi  Heart:  Regular rate and rhythm, no murmurs or gallops  Abdomen:  Soft, non-tender, normal bowel sounds        Lab/Data Reviewed: All lab results for the last 24 hours reviewed. Assessment/Plan     Principal Problem:    Sepsis ([address] (3/17/2020)    Active Problems:    Abscess of left thigh (3/19/2020)      Cellulitis of left thigh (3/17/2020)      Prediabetes (3/18/2020)      Overview: HbA1c 6.0% on 3/17/2020. Plan:  Continue with antibiotics. Wound Vac  Wound care  Mobilize as able.

## 2020-03-19 NOTE — PROGRESS NOTES
Problem: Falls - Risk of  Goal: *Absence of Falls  Description: Document Jose Nelson Fall Risk and appropriate interventions in the flowsheet. Outcome: Progressing Towards Goal  Note: Fall Risk Interventions:  Mobility Interventions: Patient to call before getting OOB         Medication Interventions: Patient to call before getting OOB    Elimination Interventions: Call light in reach              Problem: Patient Education: Go to Patient Education Activity  Goal: Patient/Family Education  Outcome: Progressing Towards Goal     Problem: Pressure Injury - Risk of  Goal: *Prevention of pressure injury  Description: Document Rich Scale and appropriate interventions in the flowsheet.   Outcome: Progressing Towards Goal  Note: Pressure Injury Interventions:   Turn q2hrs           Activity Interventions: Pressure redistribution bed/mattress(bed type)    Mobility Interventions: Pressure redistribution bed/mattress (bed type)    Nutrition Interventions: Document food/fluid/supplement intake                     Problem: Patient Education: Go to Patient Education Activity  Goal: Patient/Family Education  Outcome: Progressing Towards Goal     Problem: Pain  Goal: *Control of Pain  Outcome: Progressing Towards Goal     Problem: Cellulitis Care Plan (Adult)  Goal: *Control of acute pain  Outcome: Progressing Towards Goal  Goal: *Skin integrity maintained  Outcome: Progressing Towards Goal  Goal: *Absence of infection signs and symptoms  Outcome: Progressing Towards Goal     Problem: Altered nutrition  Goal: *Optimize nutritional status  Outcome: Progressing Towards Goal     Problem: Patient Education: Go to Patient Education Activity  Goal: Patient/Family Education  Outcome: Progressing Towards Goal     Problem: Patient Education: Go to Patient Education Activity  Goal: Patient/Family Education  Outcome: Progressing Towards Goal     Problem: Surgical Pathway Post-Op Day 1  Goal: Off Pathway (Use only if patient is Off Pathway)  Outcome: Progressing Towards Goal  Goal: Activity/Safety  Outcome: Progressing Towards Goal  Goal: Diagnostic Test/Procedures  Outcome: Progressing Towards Goal  Goal: Nutrition/Diet  Outcome: Progressing Towards Goal  Goal: Discharge Planning  Outcome: Progressing Towards Goal  Goal: Medications  Outcome: Progressing Towards Goal  Goal: Respiratory  Outcome: Progressing Towards Goal  Goal: Treatments/Interventions/Procedures  Outcome: Progressing Towards Goal  Goal: Psychosocial  Outcome: Progressing Towards Goal  Goal: *No signs and symptoms of infection or wound complications  Outcome: Progressing Towards Goal  Goal: *Optimal pain control at patient's stated goal  Outcome: Progressing Towards Goal  Goal: *Adequate urinary output (equal to or greater than 30 milliliters/hour)  Outcome: Progressing Towards Goal  Goal: *Hemodynamically stable  Outcome: Progressing Towards Goal  Goal: *Tolerating diet  Outcome: Progressing Towards Goal  Goal: *Demonstrates progressive activity  Outcome: Progressing Towards Goal  Goal: *Lungs clear or at baseline  Outcome: Progressing Towards Goal

## 2020-03-19 NOTE — PROGRESS NOTES
Blue telecom phone used to communicate needs with patient; although pt does speak some english. 2105- 2 mg of morphine administered IV. 2140-New wound vac machine placed by charge RN. Clean, dry and intact. [facility] now that [facility] is [facility]. Patient has order for regular diet. Patient has not required coverage since admission. A1C 6.0 per dietary note.

## 2020-03-19 NOTE — PROGRESS NOTES
0740.Bedside and Verbal shift change report given to this nurse Fara Zheng (oncoming nurse) by Kwabena Doty (offgoing nurse). Report included the following information SBAR, Kardex and MAR. Wound vac to LLE is on continuous suction at 125 with sanguinous drainage in canister. Surgical dressing to RLE intact with ace wrap and knee immobilizer. Pt pain is under control at this time. Pt shows no signs of distress at this time. Will continue to monitor. 1500 Spoke with Faith Cooper Re: blood transfusion. Pt is not to have blood transfusion prior to surgery. H&H is okay. Pt typing and crossmatching done in case pt needs transfusion after surgery tomorrow.

## 2020-03-20 ENCOUNTER — ANESTHESIA (OUTPATIENT)
Dept: SURGERY | Age: 25
DRG: 854 | End: 2020-03-20
Payer: SELF-PAY

## 2020-03-20 ENCOUNTER — ANESTHESIA EVENT (OUTPATIENT)
Dept: SURGERY | Age: 25
DRG: 854 | End: 2020-03-20
Payer: SELF-PAY

## 2020-03-20 LAB
ABO + RH BLD: NORMAL
ANION GAP SERPL CALC-SCNC: 8 MMOL/L (ref 3–18)
BLOOD GROUP ANTIBODIES SERPL: NORMAL
BUN SERPL-MCNC: 10 MG/DL (ref 7–18)
BUN/CREAT SERPL: 16 (ref 12–20)
CALCIUM SERPL-MCNC: 7.7 MG/DL (ref 8.5–10.1)
CHLORIDE SERPL-SCNC: 106 MMOL/L (ref 100–111)
CO2 SERPL-SCNC: 27 MMOL/L (ref 21–32)
CREAT SERPL-MCNC: 0.61 MG/DL (ref 0.6–1.3)
DATE LAST DOSE: ABNORMAL
GLUCOSE BLD STRIP.AUTO-MCNC: 105 MG/DL (ref 70–110)
GLUCOSE BLD STRIP.AUTO-MCNC: 131 MG/DL (ref 70–110)
GLUCOSE BLD STRIP.AUTO-MCNC: 92 MG/DL (ref 70–110)
GLUCOSE SERPL-MCNC: 98 MG/DL (ref 74–99)
POTASSIUM SERPL-SCNC: 3.5 MMOL/L (ref 3.5–5.5)
REPORTED DOSE,DOSE: ABNORMAL UNITS
REPORTED DOSE/TIME,TMG: 800
SODIUM SERPL-SCNC: 141 MMOL/L (ref 136–145)
SPECIMEN EXP DATE BLD: NORMAL
VANCOMYCIN TROUGH SERPL-MCNC: 8.4 UG/ML (ref 10–20)

## 2020-03-20 PROCEDURE — 77030035236 HC SUT PDS STRATFX BARB J&J -B: Performed by: ORTHOPAEDIC SURGERY

## 2020-03-20 PROCEDURE — 74011250636 HC RX REV CODE- 250/636: Performed by: ORTHOPAEDIC SURGERY

## 2020-03-20 PROCEDURE — 77030008462 HC STPLR SKN PROX J&J -A: Performed by: ORTHOPAEDIC SURGERY

## 2020-03-20 PROCEDURE — 36415 COLL VENOUS BLD VENIPUNCTURE: CPT

## 2020-03-20 PROCEDURE — 77030032490 HC SLV COMPR SCD KNE COVD -B: Performed by: ORTHOPAEDIC SURGERY

## 2020-03-20 PROCEDURE — 77030018836 HC SOL IRR NACL ICUM -A: Performed by: ORTHOPAEDIC SURGERY

## 2020-03-20 PROCEDURE — 76010000153 HC OR TIME 1.5 TO 2 HR: Performed by: ORTHOPAEDIC SURGERY

## 2020-03-20 PROCEDURE — 80202 ASSAY OF VANCOMYCIN: CPT

## 2020-03-20 PROCEDURE — 87075 CULTR BACTERIA EXCEPT BLOOD: CPT

## 2020-03-20 PROCEDURE — 77030012510 HC MSK AIRWY LMA TELE -B: Performed by: ANESTHESIOLOGY

## 2020-03-20 PROCEDURE — 74011000258 HC RX REV CODE- 258: Performed by: NURSE ANESTHETIST, CERTIFIED REGISTERED

## 2020-03-20 PROCEDURE — 77030002966 HC SUT PDS J&J -A: Performed by: ORTHOPAEDIC SURGERY

## 2020-03-20 PROCEDURE — 74011250636 HC RX REV CODE- 250/636: Performed by: NURSE ANESTHETIST, CERTIFIED REGISTERED

## 2020-03-20 PROCEDURE — 65270000029 HC RM PRIVATE

## 2020-03-20 PROCEDURE — 74011000272 HC RX REV CODE- 272: Performed by: ORTHOPAEDIC SURGERY

## 2020-03-20 PROCEDURE — 0HQJXZZ REPAIR LEFT UPPER LEG SKIN, EXTERNAL APPROACH: ICD-10-PCS | Performed by: ORTHOPAEDIC SURGERY

## 2020-03-20 PROCEDURE — 76060000034 HC ANESTHESIA 1.5 TO 2 HR: Performed by: ORTHOPAEDIC SURGERY

## 2020-03-20 PROCEDURE — 74011000250 HC RX REV CODE- 250: Performed by: NURSE ANESTHETIST, CERTIFIED REGISTERED

## 2020-03-20 PROCEDURE — 87205 SMEAR GRAM STAIN: CPT

## 2020-03-20 PROCEDURE — 77030013708 HC HNDPC SUC IRR PULS STRY –B: Performed by: ORTHOPAEDIC SURGERY

## 2020-03-20 PROCEDURE — 74011250637 HC RX REV CODE- 250/637: Performed by: ORTHOPAEDIC SURGERY

## 2020-03-20 PROCEDURE — 74011000258 HC RX REV CODE- 258: Performed by: ORTHOPAEDIC SURGERY

## 2020-03-20 PROCEDURE — 77030012406 HC DRN WND PENRS BARD -A: Performed by: ORTHOPAEDIC SURGERY

## 2020-03-20 PROCEDURE — 74011250636 HC RX REV CODE- 250/636: Performed by: HOSPITALIST

## 2020-03-20 PROCEDURE — 76210000006 HC OR PH I REC 0.5 TO 1 HR: Performed by: ORTHOPAEDIC SURGERY

## 2020-03-20 PROCEDURE — 82962 GLUCOSE BLOOD TEST: CPT

## 2020-03-20 PROCEDURE — 80048 BASIC METABOLIC PNL TOTAL CA: CPT

## 2020-03-20 PROCEDURE — 74011250637 HC RX REV CODE- 250/637: Performed by: NURSE ANESTHETIST, CERTIFIED REGISTERED

## 2020-03-20 PROCEDURE — 0J9M00Z DRAINAGE OF LEFT UPPER LEG SUBCUTANEOUS TISSUE AND FASCIA WITH DRAINAGE DEVICE, OPEN APPROACH: ICD-10-PCS | Performed by: ORTHOPAEDIC SURGERY

## 2020-03-20 RX ORDER — FENTANYL CITRATE 50 UG/ML
INJECTION, SOLUTION INTRAMUSCULAR; INTRAVENOUS AS NEEDED
Status: DISCONTINUED | OUTPATIENT
Start: 2020-03-20 | End: 2020-03-20 | Stop reason: HOSPADM

## 2020-03-20 RX ORDER — DEXAMETHASONE SODIUM PHOSPHATE 4 MG/ML
INJECTION, SOLUTION INTRA-ARTICULAR; INTRALESIONAL; INTRAMUSCULAR; INTRAVENOUS; SOFT TISSUE AS NEEDED
Status: DISCONTINUED | OUTPATIENT
Start: 2020-03-20 | End: 2020-03-20 | Stop reason: HOSPADM

## 2020-03-20 RX ORDER — VANCOMYCIN/0.9 % SOD CHLORIDE 1.5G/250ML
1500 PLASTIC BAG, INJECTION (ML) INTRAVENOUS EVERY 8 HOURS
Status: DISCONTINUED | OUTPATIENT
Start: 2020-03-20 | End: 2020-03-23

## 2020-03-20 RX ORDER — SODIUM CHLORIDE, SODIUM LACTATE, POTASSIUM CHLORIDE, CALCIUM CHLORIDE 600; 310; 30; 20 MG/100ML; MG/100ML; MG/100ML; MG/100ML
50 INJECTION, SOLUTION INTRAVENOUS CONTINUOUS
Status: DISCONTINUED | OUTPATIENT
Start: 2020-03-20 | End: 2020-03-20 | Stop reason: HOSPADM

## 2020-03-20 RX ORDER — ONDANSETRON 2 MG/ML
INJECTION INTRAMUSCULAR; INTRAVENOUS AS NEEDED
Status: DISCONTINUED | OUTPATIENT
Start: 2020-03-20 | End: 2020-03-20 | Stop reason: HOSPADM

## 2020-03-20 RX ORDER — PROPOFOL 10 MG/ML
INJECTION, EMULSION INTRAVENOUS AS NEEDED
Status: DISCONTINUED | OUTPATIENT
Start: 2020-03-20 | End: 2020-03-20 | Stop reason: HOSPADM

## 2020-03-20 RX ORDER — HYDROCODONE BITARTRATE AND ACETAMINOPHEN 5; 325 MG/1; MG/1
1 TABLET ORAL ONCE
Status: COMPLETED | OUTPATIENT
Start: 2020-03-20 | End: 2020-03-20

## 2020-03-20 RX ORDER — LIDOCAINE HYDROCHLORIDE 20 MG/ML
INJECTION, SOLUTION EPIDURAL; INFILTRATION; INTRACAUDAL; PERINEURAL AS NEEDED
Status: DISCONTINUED | OUTPATIENT
Start: 2020-03-20 | End: 2020-03-20 | Stop reason: HOSPADM

## 2020-03-20 RX ORDER — FENTANYL CITRATE 50 UG/ML
50 INJECTION, SOLUTION INTRAMUSCULAR; INTRAVENOUS AS NEEDED
Status: DISCONTINUED | OUTPATIENT
Start: 2020-03-20 | End: 2020-03-20 | Stop reason: HOSPADM

## 2020-03-20 RX ORDER — ONDANSETRON 2 MG/ML
4 INJECTION INTRAMUSCULAR; INTRAVENOUS ONCE
Status: DISCONTINUED | OUTPATIENT
Start: 2020-03-20 | End: 2020-03-20 | Stop reason: HOSPADM

## 2020-03-20 RX ORDER — SODIUM CHLORIDE 0.9 % (FLUSH) 0.9 %
5-40 SYRINGE (ML) INJECTION EVERY 8 HOURS
Status: DISCONTINUED | OUTPATIENT
Start: 2020-03-20 | End: 2020-03-20 | Stop reason: HOSPADM

## 2020-03-20 RX ORDER — SODIUM CHLORIDE 9 MG/ML
INJECTION, SOLUTION INTRAVENOUS
Status: DISCONTINUED | OUTPATIENT
Start: 2020-03-20 | End: 2020-03-20 | Stop reason: HOSPADM

## 2020-03-20 RX ORDER — SODIUM CHLORIDE 0.9 % (FLUSH) 0.9 %
5-40 SYRINGE (ML) INJECTION AS NEEDED
Status: DISCONTINUED | OUTPATIENT
Start: 2020-03-20 | End: 2020-03-20 | Stop reason: HOSPADM

## 2020-03-20 RX ORDER — ENOXAPARIN SODIUM 100 MG/ML
40 INJECTION SUBCUTANEOUS EVERY 24 HOURS
Status: DISCONTINUED | OUTPATIENT
Start: 2020-03-20 | End: 2020-03-23 | Stop reason: HOSPADM

## 2020-03-20 RX ORDER — HYDROMORPHONE HYDROCHLORIDE 1 MG/ML
0.5 INJECTION, SOLUTION INTRAMUSCULAR; INTRAVENOUS; SUBCUTANEOUS
Status: DISCONTINUED | OUTPATIENT
Start: 2020-03-20 | End: 2020-03-20 | Stop reason: HOSPADM

## 2020-03-20 RX ORDER — MIDAZOLAM HYDROCHLORIDE 1 MG/ML
INJECTION, SOLUTION INTRAMUSCULAR; INTRAVENOUS AS NEEDED
Status: DISCONTINUED | OUTPATIENT
Start: 2020-03-20 | End: 2020-03-20 | Stop reason: HOSPADM

## 2020-03-20 RX ADMIN — FENTANYL CITRATE 50 MCG: 50 INJECTION, SOLUTION INTRAMUSCULAR; INTRAVENOUS at 09:34

## 2020-03-20 RX ADMIN — SODIUM CHLORIDE 1250 MG: 900 INJECTION, SOLUTION INTRAVENOUS at 10:43

## 2020-03-20 RX ADMIN — DEXAMETHASONE SODIUM PHOSPHATE 4 MG: 4 INJECTION, SOLUTION INTRA-ARTICULAR; INTRALESIONAL; INTRAMUSCULAR; INTRAVENOUS; SOFT TISSUE at 07:54

## 2020-03-20 RX ADMIN — PHENYLEPHRINE HYDROCHLORIDE 100 MCG: 10 INJECTION INTRAVENOUS at 08:25

## 2020-03-20 RX ADMIN — ENOXAPARIN SODIUM 40 MG: 40 INJECTION SUBCUTANEOUS at 15:54

## 2020-03-20 RX ADMIN — PIPERACILLIN AND TAZOBACTAM 3.38 G: 3; .375 INJECTION, POWDER, LYOPHILIZED, FOR SOLUTION INTRAVENOUS at 04:05

## 2020-03-20 RX ADMIN — PIPERACILLIN AND TAZOBACTAM 3.38 G: 3; .375 INJECTION, POWDER, LYOPHILIZED, FOR SOLUTION INTRAVENOUS at 10:56

## 2020-03-20 RX ADMIN — HYDROCODONE BITARTRATE AND ACETAMINOPHEN 1 TABLET: 5; 325 TABLET ORAL at 09:52

## 2020-03-20 RX ADMIN — Medication 10 ML: at 15:55

## 2020-03-20 RX ADMIN — SODIUM CHLORIDE 1250 MG: 900 INJECTION, SOLUTION INTRAVENOUS at 02:00

## 2020-03-20 RX ADMIN — SODIUM CHLORIDE: 900 INJECTION, SOLUTION INTRAVENOUS at 07:37

## 2020-03-20 RX ADMIN — FENTANYL CITRATE 50 MCG: 50 INJECTION, SOLUTION INTRAMUSCULAR; INTRAVENOUS at 07:58

## 2020-03-20 RX ADMIN — FENTANYL CITRATE 50 MCG: 50 INJECTION, SOLUTION INTRAMUSCULAR; INTRAVENOUS at 08:46

## 2020-03-20 RX ADMIN — PROPOFOL 200 MG: 10 INJECTION, EMULSION INTRAVENOUS at 07:46

## 2020-03-20 RX ADMIN — LIDOCAINE HYDROCHLORIDE 60 MG: 20 INJECTION, SOLUTION INTRAVENOUS at 07:46

## 2020-03-20 RX ADMIN — ONDANSETRON 4 MG: 2 SOLUTION INTRAMUSCULAR; INTRAVENOUS at 08:31

## 2020-03-20 RX ADMIN — MIDAZOLAM 2 MG: 1 INJECTION INTRAMUSCULAR; INTRAVENOUS at 07:39

## 2020-03-20 RX ADMIN — FENTANYL CITRATE 50 MCG: 50 INJECTION, SOLUTION INTRAMUSCULAR; INTRAVENOUS at 08:41

## 2020-03-20 RX ADMIN — OXYCODONE HYDROCHLORIDE AND ACETAMINOPHEN 2 TABLET: 10; 325 TABLET ORAL at 22:27

## 2020-03-20 RX ADMIN — VANCOMYCIN HYDROCHLORIDE 1500 MG: 10 INJECTION, POWDER, LYOPHILIZED, FOR SOLUTION INTRAVENOUS at 19:03

## 2020-03-20 RX ADMIN — PIPERACILLIN AND TAZOBACTAM 3.38 G: 3; .375 INJECTION, POWDER, LYOPHILIZED, FOR SOLUTION INTRAVENOUS at 19:00

## 2020-03-20 RX ADMIN — FENTANYL CITRATE 50 MCG: 50 INJECTION, SOLUTION INTRAMUSCULAR; INTRAVENOUS at 07:46

## 2020-03-20 RX ADMIN — Medication 10 ML: at 22:25

## 2020-03-20 NOTE — PROGRESS NOTES
Pt on PT hold today secondary to surgery this am; please clarify weight bearing status. Will resume with POC as appropriate.   Fernando Guido, PTA

## 2020-03-20 NOTE — BRIEF OP NOTE
BRIEF OPERATIVE NOTE    Date of Procedure: 3/20/2020   Preoperative Diagnosis: Abscess of left leg [L02.416]  Postoperative Diagnosis: Abscess of left leg [L02.416]    Procedure(s):  EXTREMITY IRRIGATION AND DEBRIDEMENT OF LEFT THIGH ABSCESS and left gluteal region with delayed primary closure  Surgeon(s) and Role:     Ruthie Valderrama MD - Primary         Surgical Assistant: Samina Hay    Surgical Staff:  Circ-1: Shanon Wagner RN  Circ-2: Trish Mueller RN  Scrub Tech-1: Irena Gonzalez  Surg Asst-1: Natasha BARNETT  Event Time In   Incision Start 03/20/2020 1305   Incision Close      Anesthesia: General   Estimated Blood Loss: 10  Specimens:   ID Type Source Tests Collected by Time Destination   1 : LEFT GLUTEAL WOUND DEEP SWAB Wound Thigh CULTURE, ANAEROBIC, CULTURE, WOUND W Yoli Hull MD 3/20/2020 0743 Microbiology      Findings: intramuscular edema in gluteus julius - no abscess or septic trochanteric bursitis   Complications: none  Implants: * No implants in log *     Medium hemovac placed in thigh.     Angela Ragsdale MD

## 2020-03-20 NOTE — PROGRESS NOTES
Shift change bedside report received from [facility], [facility]. Patient not in room.  OR this am.

## 2020-03-20 NOTE — PROGRESS NOTES
Plan remaons home with hh. Pt does not have insurance. If needs iv antibxs need daily dosing for out pt infusion center.

## 2020-03-20 NOTE — PROGRESS NOTES
1934: Patient is awake,alert and oriented x4. No signs of distress . Bed low and locked. Call bell within reach. Will continue monitoring. 0000: Made patient aware of NPO status,verbalizes understanding. 0630: In bed resting quietly,uneventful night,no other concern  at this time,call bell within reach. Will monitor. 5162: Off to OR.    0144: Bedside and Verbal shift change report given to Casimiro Diaz (oncoming nurse) by Nathaly Davison (offgoing nurse). Report included the following information SBAR, Kardex, MAR and Recent Results.

## 2020-03-20 NOTE — OP NOTES
[facility]  OPERATIVE REPORT    Name:  Moses Kirkpatrick  MR#:   [id number]  :  1995  ACCOUNT #:  [de-identified]  DATE OF SERVICE:  2020    PREOPERATIVE DIAGNOSES:  Left thigh abscess and left gluteal swelling. POSTOPERATIVE DIAGNOSES:  Left thigh abscess and left gluteus julius myositis. PROCEDURE PERFORMED:  1. Incision and drainage of the left thigh with delayed primary closure and deep drain placement. 2.  Incision and drainage of left gluteal region. SURGEON:  Syl Taylor MD    ASSISTANT:  Sharyle Mosher, CSA    ANESTHESIA:  General.    COMPLICATIONS:  none. SPECIMENS REMOVED:  Cultures, left gluteal region. IMPLANTS:  None. ESTIMATED BLOOD LOSS:  10 mL. FINDINGS:  No mabel pus encountered. No necrotic tissue encountered. COUNTS:  Correct at the end of case. CONDITION:  Stable to PACU. INDICATIONS AND HISTORY:  The patient is a 71-year-old who presented with a large thigh abscess on the left. After discussing the risks, benefits, and potential complications, our surgical treatment recommendations for plan return to the operating today for repeat I and D. Overnight, he developed rapidly progressive swelling and tenderness in the gluteal region concerning for extension of an abscess or a separate abscess. My recommendation with him preoperatively was to explore this. He was in full agreement. We discussed the risks and benefits via . DESCRIPTION OF PROCEDURE:  The patient was identified, procedure was verified. After successful induction of anesthesia, the patient's left leg and gluteal region were prepped and draped. I performed a comprehensive timeout. Presurgical marking was clearly visible on the field. Prior to prep and drape, I removed the VAC sponge. Prior to opening the thigh abscess up, I covered it in a sterile drape and then opened the gluteal region.   I made a curvilinear incision over the greater trochanter and gluteal region. There was significant edema in the posterior soft tissue and myositis with gluteus julius muscle, but no mabel abscess. There was trace amount of fluid, which I had sent for culture. No necrotic or purulence noted. After irrigating it copiously with 3 L of normal saline, I used absorbable monofilament  to close the deep fascia, subcutaneous tissue, and staples on the skin. Once this was closed, attention was turned to the thigh. I reopened the previous incision. There was no necrotic tissue. There was no evidence of any abscess. This area was copiously irrigated with 3 L of normal saline. I placed a deep drain and then performed a delayed primary closure over this drain. The patient tolerated the procedure well. POSTOPERATIVE PLAN:  IV antibiotics per Internal Medicine. Keep the drain in for 48 hours. If he is doing well at that point, he can be discharged with follow up in 2 to 3 weeks with myself for staple removal.           Operative Report    Patient: Alexia Orona MRN: [id number]  SSN: [id number]    YOB: 1995  Age: 26 y.o.   Sex: male       Date of Surgery: 3/20/2020     Preoperative Diagnosis: Abscess of left leg [L02.416]     Postoperative Diagnosis: Abscess of left leg [L02.416]     Surgeon(s) and Role:     Nelly Jang MD - Primary    Anesthesia: General     Procedure: Procedure(s):  EXTREMITY IRRIGATION AND DEBRIDEMENT OF LEFT THIGH ABSCESS     Procedure in Detail: see above      Estimated Blood Loss:  See above    Tourniquet Time: * No tourniquets in log *      Implants: * No implants in log *            Specimens:   ID Type Source Tests Collected by Time Destination   1 : LEFT GLUTEAL WOUND DEEP SWAB Wound Thigh CULTURE, ANAEROBIC, CULTURE, WOUND W Al Pack MD 3/20/2020 6930 Microbiology           Drains: None                Complications: None    Counts: Sponge and needle counts were correct times two.    Signed By:  Olga Manzano MD     March 23, 2020          Reza Euceda MD      [id number]  D:  03/20/2020 9:46  T:  03/20/2020 14:27  JOB #:  [id number]

## 2020-03-20 NOTE — PROGRESS NOTES
Problem: Falls - Risk of  Goal: *Absence of Falls  Description: Document Kristi Yossi Fall Risk and appropriate interventions in the flowsheet. Outcome: Progressing Towards Goal  Note: Fall Risk Interventions:  Mobility Interventions: Patient to call before getting OOB         Medication Interventions: Patient to call before getting OOB, Teach patient to arise slowly, Utilize gait belt for transfers/ambulation    Elimination Interventions: Call light in reach              Problem: Patient Education: Go to Patient Education Activity  Goal: Patient/Family Education  Outcome: Progressing Towards Goal     Problem: Pressure Injury - Risk of  Goal: *Prevention of pressure injury  Description: Document Rich Scale and appropriate interventions in the flowsheet. Outcome: Progressing Towards Goal  Note: Pressure Injury Interventions:             Activity Interventions: Pressure redistribution bed/mattress(bed type), PT/OT evaluation    Mobility Interventions: HOB 30 degrees or less, Pressure redistribution bed/mattress (bed type), PT/OT evaluation    Nutrition Interventions: Document food/fluid/supplement intake                     Problem: Patient Education: Go to Patient Education Activity  Goal: Patient/Family Education  Outcome: Progressing Towards Goal     Problem: Pain  Goal: *Control of Pain  Outcome: Progressing Towards Goal     Problem: Cellulitis Care Plan (Adult)  Goal: *Control of acute pain  Outcome: Progressing Towards Goal  Goal: *Skin integrity maintained  Outcome: Progressing Towards Goal  Goal: *Absence of infection signs and symptoms  Outcome: Progressing Towards Goal     Problem: Altered nutrition  Goal: *Optimize nutritional status  Outcome: Progressing Towards Goal     Problem: Patient Education: Go to Patient Education Activity  Goal: Patient/Family Education  Outcome: Progressing Towards Goal     Problem: Patient Education: Go to Patient Education Activity  Goal: Patient/Family Education  Outcome: Progressing Towards Goal     Problem: Surgical Pathway Post-Op Day 1  Goal: Off Pathway (Use only if patient is Off Pathway)  Outcome: Progressing Towards Goal  Goal: Activity/Safety  Outcome: Progressing Towards Goal  Goal: Diagnostic Test/Procedures  Outcome: Progressing Towards Goal  Goal: Nutrition/Diet  Outcome: Progressing Towards Goal  Goal: Discharge Planning  Outcome: Progressing Towards Goal  Goal: Medications  Outcome: Progressing Towards Goal  Goal: Respiratory  Outcome: Progressing Towards Goal  Goal: Treatments/Interventions/Procedures  Outcome: Progressing Towards Goal  Goal: Psychosocial  Outcome: Progressing Towards Goal  Goal: *No signs and symptoms of infection or wound complications  Outcome: Progressing Towards Goal  Goal: *Optimal pain control at patient's stated goal  Outcome: Progressing Towards Goal  Goal: *Adequate urinary output (equal to or greater than 30 milliliters/hour)  Outcome: Progressing Towards Goal  Goal: *Hemodynamically stable  Outcome: Progressing Towards Goal  Goal: *Tolerating diet  Outcome: Progressing Towards Goal  Goal: *Demonstrates progressive activity  Outcome: Progressing Towards Goal  Goal: *Lungs clear or at baseline  Outcome: Progressing Towards Goal

## 2020-03-20 NOTE — PROGRESS NOTES
Late Entry. Patient seen and managed 3/19/20. Note entered as late entry    Progress Note      Patient: Fausto Crespo               Sex: male          DOA: 3/17/2020       YOB: 1995      Age:  26 y.o.        LOS:  LOS: 1 day             CHIEF COMPLAINT:  Cellulitis    Subjective:     No distress    Objective:      Visit Vitals  /75 (BP 1 Location: Left arm, BP Patient Position: At rest)   Pulse 69   Temp 100.2 °F (37.9 °C)   Resp 16   Ht 5' 7\" (1.702 m)   Wt 82.2 kg (181 lb 3.2 oz)   SpO2 93%   BMI 28.38 kg/m²       Physical Exam:  Gen:  No distress today, no complaint. Lungs:  Clear bilaterally, no wheezes or rales. Heart:  Regular rate and rhythm. No murmur. Lab/Data Reviewed: All lab results for the last 24 hours reviewed. Assessment/Plan     Principal Problem:    Sepsis ([address] (3/17/2020)    Active Problems:    Abscess of left thigh (3/19/2020)      Cellulitis of left thigh (3/17/2020)      Prediabetes (3/18/2020)      Overview: HbA1c 6.0% on 3/17/2020.       Thigh abscess (3/19/2020)        Plan:  Continue with antibiotics  Follow culture  Mobilize  Pain control

## 2020-03-20 NOTE — ANESTHESIA PREPROCEDURE EVALUATION
Relevant Problems   No relevant active problems       Anesthetic History               Review of Systems / Medical History  Patient summary reviewed, nursing notes reviewed and pertinent labs reviewed    Pulmonary                   Neuro/Psych              Cardiovascular                       GI/Hepatic/Renal                Endo/Other            Comments: PREDIABETIC Other Findings              Physical Exam    Airway  Mallampati: II  TM Distance: 4 - 6 cm  Neck ROM: short neck        Cardiovascular    Rhythm: regular  Rate: normal         Dental  No notable dental hx       Pulmonary  Breath sounds clear to auscultation               Abdominal  GI exam deferred       Other Findings            Anesthetic Plan    ASA: 2  Anesthesia type: general          Induction: Intravenous  Anesthetic plan and risks discussed with: Patient

## 2020-03-20 NOTE — CDMP QUERY
Patient admitted with cellulitis/abscess. Per Op note dated 3/19 documentation of debridement / I&D. To accurately reflect the procedure performed please addend the note to include: 
 
=> Excisional debridement = cutting off or away--without replacement--devitalized tissue, necrosis or slough  
=> Non-excisional debridement = brushing, irrigating, scrubbing or washing of devitalized tissue, necrosis or slough  ie Minor removal of loose fragments, scraping, whirlpool, mechanical or pulsatile lavage, irrigation 
=> Type of instrument used The medical record reflects the following: 
 
Risk Factors: abscess/cellulitis Clinical Indicators: per op report: 
- debridement of the fascia, subcutaneous tissue, and skin. Treatment: surgical debridement, wound vac Thank you, [address] [place] [address], [facility] 
[phone] 
[email or web address]

## 2020-03-20 NOTE — PROGRESS NOTES
Patient return to room after OR from PACU accompanied by Slava Busby RN and arnol Brand. received in bed awake. Noted immobilizer to left leg with hemovac not visible to left leg. Patient Kyrgyz speaking and english with blue  phone to bedside. A&Ox4, denies pain. No distress noted. Frequently use items within reach. Bed locked in low position. Call bell within reach and Patient verbalized understanding of use for assistance and needs.

## 2020-03-20 NOTE — PROGRESS NOTES
Aura Drape Duaine Blank    Will maintain HV till Sunday AM.  Continue IV abx. Start Lovenox 40mg sq daily starting this afternoon plus SCD for DVT prophylaxis.       Sulaiman Colón MD

## 2020-03-20 NOTE — PROGRESS NOTES
Problem: Falls - Risk of  Goal: *Absence of Falls  Description: Document Sarai Ash Fall Risk and appropriate interventions in the flowsheet.   Outcome: Progressing Towards Goal  Note: Fall Risk Interventions:  Mobility Interventions: Patient to call before getting OOB         Medication Interventions: Evaluate medications/consider consulting pharmacy, Patient to call before getting OOB    Elimination Interventions: Call light in reach, Patient to call for help with toileting needs              Problem: Patient Education: Go to Patient Education Activity  Goal: Patient/Family Education  Outcome: Progressing Towards Goal     Problem: Pain  Goal: *Control of Pain  Outcome: Progressing Towards Goal     Problem: Cellulitis Care Plan (Adult)  Goal: *Control of acute pain  Outcome: Progressing Towards Goal

## 2020-03-20 NOTE — PROGRESS NOTES
Ortho Osmel Escamilla    Planned return to OR for left thigh I&D and vac change vs closure. Worsening swelling left buttox may require further exploration. Site marked. Consent obtained. Questions answered. This is limb threatening infection.     Aure Zhang MD

## 2020-03-20 NOTE — PERIOP NOTES
5852 Patient arrived to PACU. Assumed care. Connected to monitor. VSS. Will continue to monitor    0955 TRANSFER - OUT REPORT:    Verbal report given to Shankar Song (name) on [facility]  being transferred to (unit) for routine post - op       Report consisted of patients Situation, Background, Assessment and   Recommendations(SBAR). Information from the following report(s) SBAR, OR Summary and Cardiac Rhythm NSR was reviewed with the receiving nurse. Lines:   Peripheral IV 03/17/20 Left Antecubital (Active)   Site Assessment Clean, dry, & intact 3/20/2020  9:16 AM   Phlebitis Assessment 0 3/20/2020  9:16 AM   Infiltration Assessment 0 3/20/2020  9:16 AM   Dressing Status Clean, dry, & intact 3/20/2020  9:16 AM   Dressing Type Transparent 3/20/2020  9:16 AM   Hub Color/Line Status Green;Flushed 3/20/2020  9:16 AM   Action Taken Open ports on tubing capped 3/19/2020  7:34 PM   Alcohol Cap Used Yes 3/19/2020  7:34 PM       Peripheral IV 03/17/20 Right Antecubital (Active)   Site Assessment Clean, dry, & intact 3/20/2020  9:16 AM   Phlebitis Assessment 0 3/20/2020  9:16 AM   Infiltration Assessment 0 3/20/2020  9:16 AM   Dressing Status Clean, dry, & intact 3/20/2020  9:16 AM   Dressing Type Transparent 3/20/2020  9:16 AM   Hub Color/Line Status Green; Infusing 3/20/2020  9:16 AM   Action Taken Open ports on tubing capped 3/19/2020  7:34 PM   Alcohol Cap Used Yes 3/19/2020  7:34 PM        Opportunity for questions and clarification was provided.       Patient transported with:   Registered Nurse

## 2020-03-20 NOTE — PROGRESS NOTES
Progress Note      Patient: Estuardo Sanders               Sex: male          DOA: 3/17/2020       YOB: 1995      Age:  26 y.o.        LOS:  LOS: 1 day             CHIEF COMPLAINT:  Left thigh abscess and cellulitis    Subjective:     No distress    Objective:      Visit Vitals  /81 (BP 1 Location: Left arm, BP Patient Position: At rest)   Pulse 96   Temp 98.3 °F (36.8 °C)   Resp 16   Ht 5' 7\" (1.702 m)   Wt 82.2 kg (181 lb 3.2 oz)   SpO2 95%   BMI 28.38 kg/m²       Physical Exam:  Gen:  No distress, no complaint  Lungs:  Clear bilaterally, no wheeze or rhonchi  Heart:  Regular rate and rhythm, no murmurs or gallops  Abdomen:  Soft, non-tender, normal bowel sounds        Lab/Data Reviewed:  BMP:   Lab Results   Component Value Date/Time     03/20/2020 04:20 AM    K 3.5 03/20/2020 04:20 AM     03/20/2020 04:20 AM    CO2 27 03/20/2020 04:20 AM    AGAP 8 03/20/2020 04:20 AM    GLU 98 03/20/2020 04:20 AM    BUN 10 03/20/2020 04:20 AM    CREA 0.61 03/20/2020 04:20 AM    GFRAA >60 03/20/2020 04:20 AM    GFRNA >60 03/20/2020 04:20 AM       Assessment/Plan     Principal Problem:    Sepsis ([address] (3/17/2020)    Active Problems:    Abscess of left thigh (3/19/2020)      Cellulitis of left thigh (3/17/2020)      Prediabetes (3/18/2020)      Overview: HbA1c 6.0% on 3/17/2020. Thigh abscess (3/19/2020)        Plan:  Continue antibiotics  Continue wound vac  We talked by the blue cyracom phone.

## 2020-03-20 NOTE — ROUTINE PROCESS
TRANSFER - IN REPORT: 
 
Verbal report received from Morgan Daniel RN on [facility]  being received from PACU (unit) for routine post - op Report consisted of patients Situation, Background, Assessment and  
Recommendations(SBAR). Information from the following report(s) SBAR, Procedure Summary, Intake/Output and MAR was reviewed with the receiving nurse. Opportunity for questions and clarification was provided. Assessment will be completed upon patients arrival to unit and care assumed.

## 2020-03-21 LAB
ANION GAP SERPL CALC-SCNC: 9 MMOL/L (ref 3–18)
BACTERIA SPEC CULT: ABNORMAL
BUN SERPL-MCNC: 10 MG/DL (ref 7–18)
BUN/CREAT SERPL: 16 (ref 12–20)
CALCIUM SERPL-MCNC: 7.7 MG/DL (ref 8.5–10.1)
CHLORIDE SERPL-SCNC: 105 MMOL/L (ref 100–111)
CO2 SERPL-SCNC: 27 MMOL/L (ref 21–32)
CREAT SERPL-MCNC: 0.64 MG/DL (ref 0.6–1.3)
DATE LAST DOSE: NORMAL
GLUCOSE BLD STRIP.AUTO-MCNC: 126 MG/DL (ref 70–110)
GLUCOSE BLD STRIP.AUTO-MCNC: 126 MG/DL (ref 70–110)
GLUCOSE BLD STRIP.AUTO-MCNC: 85 MG/DL (ref 70–110)
GLUCOSE BLD STRIP.AUTO-MCNC: 88 MG/DL (ref 70–110)
GLUCOSE SERPL-MCNC: 86 MG/DL (ref 74–99)
GRAM STN SPEC: ABNORMAL
GRAM STN SPEC: ABNORMAL
POTASSIUM SERPL-SCNC: 3.5 MMOL/L (ref 3.5–5.5)
REPORTED DOSE,DOSE: 1500 UNITS
REPORTED DOSE/TIME,TMG: 1000
SERVICE CMNT-IMP: ABNORMAL
SODIUM SERPL-SCNC: 141 MMOL/L (ref 136–145)
VANCOMYCIN TROUGH SERPL-MCNC: 14 UG/ML (ref 10–20)

## 2020-03-21 PROCEDURE — 74011000258 HC RX REV CODE- 258: Performed by: ORTHOPAEDIC SURGERY

## 2020-03-21 PROCEDURE — 82962 GLUCOSE BLOOD TEST: CPT

## 2020-03-21 PROCEDURE — 74011250636 HC RX REV CODE- 250/636: Performed by: HOSPITALIST

## 2020-03-21 PROCEDURE — 97530 THERAPEUTIC ACTIVITIES: CPT

## 2020-03-21 PROCEDURE — 80202 ASSAY OF VANCOMYCIN: CPT

## 2020-03-21 PROCEDURE — 65270000029 HC RM PRIVATE

## 2020-03-21 PROCEDURE — 80048 BASIC METABOLIC PNL TOTAL CA: CPT

## 2020-03-21 PROCEDURE — 36415 COLL VENOUS BLD VENIPUNCTURE: CPT

## 2020-03-21 PROCEDURE — 74011250637 HC RX REV CODE- 250/637: Performed by: ORTHOPAEDIC SURGERY

## 2020-03-21 PROCEDURE — 74011250636 HC RX REV CODE- 250/636: Performed by: ORTHOPAEDIC SURGERY

## 2020-03-21 PROCEDURE — 74011250636 HC RX REV CODE- 250/636: Performed by: PHYSICIAN ASSISTANT

## 2020-03-21 PROCEDURE — 97110 THERAPEUTIC EXERCISES: CPT

## 2020-03-21 RX ADMIN — ENOXAPARIN SODIUM 40 MG: 40 INJECTION SUBCUTANEOUS at 15:12

## 2020-03-21 RX ADMIN — SODIUM CHLORIDE 50 ML/HR: 900 INJECTION, SOLUTION INTRAVENOUS at 02:19

## 2020-03-21 RX ADMIN — PIPERACILLIN AND TAZOBACTAM 3.38 G: 3; .375 INJECTION, POWDER, LYOPHILIZED, FOR SOLUTION INTRAVENOUS at 19:58

## 2020-03-21 RX ADMIN — PIPERACILLIN AND TAZOBACTAM 3.38 G: 3; .375 INJECTION, POWDER, LYOPHILIZED, FOR SOLUTION INTRAVENOUS at 03:53

## 2020-03-21 RX ADMIN — OXYCODONE HYDROCHLORIDE AND ACETAMINOPHEN 2 TABLET: 10; 325 TABLET ORAL at 12:53

## 2020-03-21 RX ADMIN — Medication 10 ML: at 07:22

## 2020-03-21 RX ADMIN — VANCOMYCIN HYDROCHLORIDE 1500 MG: 10 INJECTION, POWDER, LYOPHILIZED, FOR SOLUTION INTRAVENOUS at 18:00

## 2020-03-21 RX ADMIN — Medication 10 ML: at 22:00

## 2020-03-21 RX ADMIN — VANCOMYCIN HYDROCHLORIDE 1500 MG: 10 INJECTION, POWDER, LYOPHILIZED, FOR SOLUTION INTRAVENOUS at 10:26

## 2020-03-21 RX ADMIN — PIPERACILLIN AND TAZOBACTAM 3.38 G: 3; .375 INJECTION, POWDER, LYOPHILIZED, FOR SOLUTION INTRAVENOUS at 10:24

## 2020-03-21 RX ADMIN — VANCOMYCIN HYDROCHLORIDE 1500 MG: 10 INJECTION, POWDER, LYOPHILIZED, FOR SOLUTION INTRAVENOUS at 02:19

## 2020-03-21 NOTE — ROUTINE PROCESS
Bedside and Verbal shift change report given to Dulce Kidd0 [facility] (oncoming nurse) by Stewart Reddy RN, BSN (offgoing nurse). Report given with SBAR, Kardex, Intake/Output, MAR and Recent Results.

## 2020-03-21 NOTE — PROGRESS NOTES
Pharmacy Dosing Services: Vancomycin    Indication: Skin Soft Tissue Infection    Day of therapy: 3    Other Antimicrobials (Include dose, start day & day of therapy):  Piperacillin/Tazobactam (Zosyn)    Loading dose (date given): 2000 mg  Current Maintenance dose: 1500 IV every 12 hours    Goal Vancomycin Level: Vancomycin Trough: 15 - 20 mcg/mL (most infections)    Vancomycin Level (if drawn):   Recent Labs     03/21/20  1730 03/20/20  1030 03/19/20  0120   VANCT 14.0 8.4* 0.8*   0.8 - one hour prior to next dose  8.4 - 30 minutes prior to next dose however there was a two hour delay in doses.   Significant Cultures:   Results       Procedure Component Value Units Date/Time    CULTURE, TISSUE Ben Sartorius STAIN [[id number]] Collected:  03/20/20 0800    Order Status:  Canceled Specimen:  Thigh     CULTURE, Rey Baumgarten STAIN [[id number]] Collected:  03/20/20 0800    Order Status:  Completed Specimen:  Thigh Updated:  03/21/20 1229     Special Requests: LEFT GLUTEAL WOUND     GRAM STAIN RARE WBCS SEEN         NO ORGANISMS SEEN        Culture result: NO GROWTH AFTER 13 HOURS       CULTURE, TISSUE Ben Sartorius STAIN [[id number]]  (Abnormal)  (Susceptibility) Collected:  03/18/20 1721    Order Status:  Completed Specimen:  Thigh Updated:  03/21/20 0843     Special Requests: NO SPECIAL REQUESTS        GRAM STAIN FEW WBCS SEEN         NO ORGANISMS SEEN        Culture result:       MODERATE * METHICILLIN RESISTANT STAPHYLOCOCCUS AUREUS *          Susceptibility        Staphylococcus aureus Methcillin Resistant     JENNA     Ciprofloxacin ($) Resistant     Clindamycin ($) Resistant     Daptomycin ($$$$$) Susceptible     Doxycycline ($$) Susceptible     Erythromycin ($$$$) Resistant     Gentamicin ($) Susceptible     Levofloxacin ($) Intermediate     Linezolid ($$$$$) Susceptible     Moxifloxacin ($$$$) Susceptible     Oxacillin Resistant     Rifampin ($$$$) Susceptible     Tetracycline Susceptible     Trimeth/Sulfa Susceptible Vancomycin ($) Susceptible                      CULTURE, Dot Yelitza STAIN [[id number]] Collected:  20 194    Order Status:  Canceled Specimen:  Wound from Thigh     CULTURE, BLOOD [[id number]] Collected:  20    Order Status:  Completed Specimen:  Blood Updated:  20     Special Requests: --        NO SPECIAL REQUESTS  LEFT  Antecubital       Culture result: NO GROWTH 4 DAYS       CULTURE, BLOOD [[id number]] Collected:  20    Order Status:  Completed Specimen:  Blood Updated:  20     Special Requests: --        NO SPECIAL REQUESTS  RIGHT  Antecubital       Culture result: NO GROWTH 4 DAYS               Weight 82.2 kg (181 lb 3.2 oz)  Recent Labs     20  0310 20  0420 20  1530   CREA 0.64 0.61 0.83   BUN 10 10 12   WBC  --   --  20.1*     Temp (72hrs), Av.3 °F (36.8 °C), Min:97.3 °F (36.3 °C), Max:100.2 °F (37.9 °C)    Estimated Creatinine Clearance Estimated Creatinine Clearance: 166.9 mL/min (by C-G formula based on SCr of 0.64 mg/dL). Estimated Creatinine Clearance (using IBW): 166.4 mL/min       CAPD, Hemodialysis or Renal Replacement Therapy: N/A    Renal function stable? (unstable defined as SCr increase of 0.5 mg/dL or > 50% increase from baseline, whichever is greater) (Y/N): Y       Regimen assessment:   - Vancomycin trough level of 14 mcg/mL after 3 doses of 1500 mg of vancomycin  - Expect trough to be ~16 mcg/mL when patient is at steady state with the new dose (patient was previously on 1250 mg q8hr)  - Will recheck trough at steady state to ensure therapeutic levels  Maintenance dose: 1500 mg IV every 8 hours   Next scheduled level: Trough on 3/23 at [address] will follow daily and adjust medications as appropriate for renal function and/or serum levels.     Thank you,  Chandan Soto

## 2020-03-21 NOTE — PROGRESS NOTES
Problem: Mobility Impaired (Adult and Pediatric)  Goal: *Acute Goals and Plan of Care (Insert Text)  Description: Physical Therapy Goals  Initiated 3/19/2020 and to be accomplished within 7 day(s)  1. Patient will move from supine to sit and sit to supine in bed with modified independence. 2.  Patient will transfer from bed to chair and chair to bed with modified independence using the least restrictive device. 3.  Patient will perform sit to stand with modified independence. 4.  Patient will ambulate with modified independence for 50 feet with the least restrictive device. 5.  Patient will ascend/descend 6 stairs with handrail(s) with modified independence. Outcome: Progressing Towards Goal   PHYSICAL THERAPY TREATMENT     Patient: Lazaro July (32 y.o. male)  Date: 3/21/2020  Diagnosis: Cellulitis of left thigh [L03.116]  Sepsis ([address] [A41.9]  Thigh abscess [L02.419]   Sepsis ([address]  Procedure(s) (LRB):  EXTREMITY IRRIGATION AND DEBRIDEMENT OF LEFT THIGH ABSCESS (Left) 1 Day Post-Op  Precautions: Fall, PWB(LLE)  PLOF: Independent    ASSESSMENT:  Patient supine in bed, agreeable to participation with PT. Use [facility] phone t/o tx session as the patient speaks [place]. PWB LLE s/p wound vac placement. Continues to need clarification on specifics of PWB LLE. Knee immobilizer on patient upon entry; remained donned during session. Supervision for supine to sit. Prior to OOB activity PT verbalized L PWB guidelines. Required Min A for sit to stand from bed to RW - independently extends L LE in order to decrease L LE weightbearing with transfer without need for VCs. Supervision for bed to chair transfer in order to facilitate navigation of lines. Ambulates 4ft with supervision for line mgt from bed to recliner L LE PWB with RW to off weight LLE. Demonstrated decreased eccentric control with stand to sit transfer into recliner. Seated in recliner with L LE elevated on pillow. MITCHEL Batista aware. Educated on need for RN assistance with mobility in order to transfer back to bed and regular performance of ankle pumps; verbalized understanding. Denies increased pain in L LE post mobility. Call bell and all needs w/ in reach. Patient educated on safety with mobility and standing/seated exercises. Verbalizes understanding. Progression toward goals:        Improving appropriately and progressing toward goals       PLAN:  Patient continues to benefit from skilled intervention to address the above impairments. Continue treatment per established plan of care. EDUCATION:   Education: Patient was educated on the following topics: Safety and proper mechanics with bed mobility, transfers, ADLs, balance, amb, exercise, role of PT, plan of care, cognition, skin integrity, vitals       Barriers to Learning/Limitations: yes;  language  Compensate with: visual, verbal, tactile, kinesthetic cues/model    Discharge Recommendations:  Home Health  Further Equipment Recommendations for Discharge:  rolling walker  Factors which may impact discharge planning: Medical Condition     SUBJECTIVE:   Patient stated a little bit of pain.     OBJECTIVE DATA SUMMARY:   Critical Behavior:  Neurologic State: Alert  Orientation Level: Oriented X4  Cognition: Appropriate decision making, Follows commands  Safety/Judgement: Fall prevention  Functional Mobility Training:  Bed Mobility:     Supine to Sit: Supervision               Transfers:  Sit to Stand: Minimum assistance  Stand to Sit: Stand-by assistance                             Balance:  Sitting: Intact  Sitting - Static: Good (unsupported)  Sitting - Dynamic: Good (unsupported)  Standing: Impaired  Standing - Static: Good  Standing - Dynamic : Fair   Range Of Motion:                                  Ambulation/Gait Training:  Distance (ft): 4 Feet (ft)  Assistive Device: Walker, rolling  Ambulation - Level of Assistance: Stand-by assistance           Right Side Weight Bearing: Full  Left Side Weight Bearing: Partial (%)  Base of Support: Shift to right        Step Length: Right shortened  Pain:  Pain level pre-treatment: 3/10   Pain level post-treatment: 3/10   Pain Intervention(s): Medication (see MAR); Rest, L LE elevation, and Repositioning   Response to intervention: Nurse notified - Pina Grigsby, See doc flow    Activity Tolerance: Good  Please refer to the flowsheet for vital signs taken during this treatment. After treatment:   [x] Patient left in no apparent distress sitting up in chair  [] Patient left in no apparent distress in bed  [x] Call bell left within reach  [x] Nursing notified - Pina Grigsby  [] Caregiver present  [] Bed alarm activated  [] SCDs applied      COMMUNICATION/EDUCATION:   [x]         Role of Physical Therapy in the acute care setting. [x]         Fall prevention education was provided and the patient/caregiver indicated understanding. [x]         Patient/family have participated as able and agree with findings and recommendations. []         Patient is unable to participate in plan of care at this time.   []         Other:        Iline Shoe   Time Calculation: 16 mins

## 2020-03-21 NOTE — PROGRESS NOTES
NUTRITION FOLLOW UP/PLAN OF CARE      RECOMMENDATIONS:   1. Regular diet at this time to promote increased intake  2. Monitor labs, weight and PO intake  3. RD to follow     GOALS:   1. PO intake meets >75% of protein/calorie needs by 3/24      ASSESSMENT:   Wt status is classified as overweight per Body mass index is 28.38 kg/m². Pt is s/p Incision and drainage of the left thigh with delayed primary closure and deep drain placement and Incision and drainage of left gluteal region. Pt reports decline in appetite today. Labs noted. BG range () over the past 24 hrs. Nutrition recommendations listed. RD to follow. Nutrition Diagnoses:   BS well controlled at this time: Altered nutrition related lab values related to new prediabetes Dx with nutrition knowledge deficit as evidenced by A1c (6.0%) and Pt recall. SUBJECTIVE/OBJECTIVE:   (3/21/20) Pt sitting in chair at bedside during time of assessment, pt does report some pain during visit. Pt reports appetite is down today but drinking ok. Per RN documents pt reports consumed 70% of breakfast, per lunch tray visual pt consumed ~50% of lunch. Denies n/v/d/c at this time, per I/Os BM today. CBW: 181 lb 3/18. L leg incision noted. Pt now with regular diet (ordered 3/20 per RN) will keep liberalize to help promote increased PO intake at this time, encouraged pt to implement food preferences with [employer] but to be mindful of previous education provided, spoke with kitchen as well. Noted BS past 24 hrs WNL. Will continue to monitor intake, weight, labs, POC. Per previous RD notes:  (3/19/20) Pt is s/p Incision and drainage of the left thigh abscess with debridement of the fascia, subcutaneous tissue, and skin. With [facility] dressing placement 20 cm x 8 cm wound on (3/18). Pt seen in room OOB in chair with wound vac in place. Feeling better today. Denies any N//V/ABD pain.  Reports appetite is good and he has a menu to implement preferences with dietary. No questions at this time. Will continue to monitor. (3/18/20) Pt admitted for cellulitis of left thigh and sepsis. Ortho consulted: plan for I&D of left thigh today. Pt seen in room this afternoon awaiting procedure. Pt is [place] speaking so utilized blue phone; ID# [id number]. Reports having a good appetite and normally consuming 3 meals per day at home. NKFA or problems chewing/swallowing and stable weight PTA;  lb per Pt. Pt stated he was feeling nauseous so informed RN, [facility], who provided Zofran. Discussed CHO choices and the plate method provided handouts for prediabetes information. Will continue to monitor and follow up to answer any questions. Information Obtained from:    [x] Chart Review   [x] Patient   [] Family/Caregiver   [] Nurse/Physician   [] Interdisciplinary Meeting/Rounds    Diet: Regular  Medications: [x] Reviewed Noted: NaCl @ 50, lovenox, humalog, zosyn, vancomycin, PRN: zofran, percocet, colace  Allergies: [x] Reviewed   Encounter Diagnoses     ICD-10-CM ICD-9-CM   1. Cellulitis of left thigh L03.116 682.6   2. Abscess of left thigh L02.416 682.6     History reviewed. No pertinent past medical history.    Labs:    Lab Results   Component Value Date/Time    Sodium 141 03/21/2020 03:10 AM    Potassium 3.5 03/21/2020 03:10 AM    Chloride 105 03/21/2020 03:10 AM    CO2 27 03/21/2020 03:10 AM    Anion gap 9 03/21/2020 03:10 AM    Glucose 86 03/21/2020 03:10 AM    BUN 10 03/21/2020 03:10 AM    Creatinine 0.64 03/21/2020 03:10 AM    Calcium 7.7 (L) 03/21/2020 03:10 AM    Albumin 2.4 (L) 03/18/2020 04:15 AM     Lab Results   Component Value Date/Time    GLU 86 03/21/2020 03:10 AM    GLUCPOC 126 (H) 03/21/2020 11:44 AM    GLUCPOC 85 03/21/2020 07:31 AM    GLUCPOC 92 03/20/2020 10:22 PM     No results found for: CHOL, CHOLPOCT, CHOLX, CHLST, CHOLV, HDL, HDLPOC, HDLP, LDL, LDLCPOC, LDLC, DLDLP, VLDLC, VLDL, TGLX, TRIGL, TRIGP, TGLPOCT, CHHD, CHHDX  Anthropometrics: BMI (calculated): 28.4  Last 3 Recorded Weights in this Encounter    03/17/20 1222 03/18/20 1949   Weight: 77.1 kg (170 lb) 82.2 kg (181 lb 3.2 oz)      Ht Readings from Last 1 Encounters:   03/17/20 5' 7\" (1.702 m)     Documented Weight History:  Weight Metrics 3/18/2020 3/17/2020   Weight 181 lb 3.2 oz -   BMI - 28.38 kg/m2     Patient Vitals for the past 100 hrs:   % Diet Eaten   03/21/20 0830 70 %   03/21/20 0732 0 %   03/20/20 1200 60 %   03/19/20 1745 50 %   03/19/20 1350 50 %   03/19/20 0910 80 %     IBW: 148 lb UBW: 170 lb   [] Weight Loss [x] Weight Gain? [] Weight Stable    Estimated Nutrition Needs: [x] MSJ x 1.2-1.3  [] Other:  Calories: 8406-8354 kcal Based on:   [x] Actual BW    Protein:   62-77 g Based on:   [x] Actual BW x 0.8-1.0    Fluid:       1 mL/kcal      [x] No Cultural, Faith or ethnic dietary need identified.     [] Cultural, Faith and ethnic food preferences identified and addressed     Wt Status:  [] Normal (18.6 - 24.9) [] Underweight (<18.5) [x] Overweight (25 - 29.9) [] Mild Obesity (30 - 34.9)  [] Moderate Obesity (35 - 39.9) [] Morbid Obesity (40+)     Nutrition Problems Identified:   [x] Suboptimal PO intake decreased appetite  [] Food Allergies  [] Difficulty chewing/swallowing/poor dentition  [] Constipation/Diarrhea   [] Nausea/Vomiting (PRN Zofran)  [] None  [] Other:     Plan:   [] Therapeutic Diet  []  Obtained/adjusted food preferences/tolerances and/or snacks options   [x]  Supplements added   [] Occupational therapy following for feeding techniques  []  HS snack added   []  Modify diet texture   []  Modify diet for food allergies   [x]  Educate patient (discussed types of CHO and provided handouts for plate method and prediabetes)  []  Assist with menu selection   [x]  Monitor PO intake on meal rounds   [x]  Continue inpatient monitoring and intervention   [x]  Participated in discharge planning/Interdisciplinary rounds/Team meetings   []  Other:     Education Needs:   [] Not appropriate for teaching at this time due to:    [x] Identified and addressed encouraged increased intake/preferences    Nutrition Monitoring and Evaluation:  [x] Continue ongoing monitoring and intervention  [] Other Zadie Opitz

## 2020-03-21 NOTE — PROGRESS NOTES
Problem: Self Care Deficits Care Plan (Adult)  Goal: *Acute Goals and Plan of Care (Insert Text)  Description: Occupational Therapy Goals  Initiated 3/19/2020 within 7 day(s). 1.  Patient will perform grooming with modified independence. 2.  Patient will perform bathing with modified independence. 3.  Patient will perform lower body dressing with modified independence. 4.  Patient will perform toilet transfers with modified independence while adhering to LLE PWB. 5.  Patient will perform all aspects of toileting with modified independence. 6.  Patient will participate in upper extremity therapeutic exercise/activities with modified independence for 15 minutes. 7.  Patient will utilize energy conservation techniques during functional activities with verbal cues. Prior Level of Function: (I) w/ ADLs and functional mobility w/o AD    Outcome: Progressing Towards Goal  OCCUPATIONAL THERAPY TREATMENT    Patient: Ivana Sweeney (81 y.o. male)  Date: 3/21/2020  Diagnosis: Cellulitis of left thigh [L03.116]  Sepsis ([address] [A41.9]  Thigh abscess [L02.419]   Sepsis ([facility] [address]  Procedure(s) (LRB):  EXTREMITY IRRIGATION AND DEBRIDEMENT OF LEFT THIGH ABSCESS (Left) 1 Day Post-Op  Precautions: Fall, PWB(LLE)  PLOF: see above    Chart, occupational therapy assessment, plan of care, and goals were reviewed. ASSESSMENT:  Nursing/Tamiko cleared for pt to participate in OT tx session. Patient presents sitting up in recliner, reports good comfort. Patient declined toileting and ADL routine. Patient participated in [employer] [address] [place] [address],[address] bar (yellow) x 1 set of 10 reps each for wrist flex/ext and shoulder adduction/abduction in order to increase strength for improved self care performance, pt tolerated well.  UE THEREX to increase strength for improved self care performance and functional transfers Flexi bar (yellow) x 1 set x 10 reps each for wrist flex/ext and shoulder adduction/abduction in order to increase strength for improved self care performance, pt tolerated well. UE THERE EX with green theraband x 20 reps for bicep curls, triceps extension and shoulder horizontal abduction with good tolerance. Call bell within reach & pt verbalized understanding and provided return demonstration to utilize for assist e.g. functional transfers in order to prevent falls. Progression toward goals:  []          Improving appropriately and progressing toward goals  [x]          Improving slowly and progressing toward goals  []          Not making progress toward goals and plan of care will be adjusted     PLAN:  Patient continues to benefit from skilled intervention to address the above impairments. Continue treatment per established plan of care. Discharge Recommendations:  Home Health  Further Equipment Recommendations for Discharge:  bedside commode and rolling walker     SUBJECTIVE:   Patient stated I feel better now.  following UE THERE EX    OBJECTIVE DATA SUMMARY:       UE Therapeutic Exercises:   See above    Pain:  Pain level pre-treatment: 0/10   Pain level post-treatment: 0/10  Pain Intervention(s): Medication (see MAR); Rest, Ice, Repositioning   Response to intervention: Nurse notified, See doc flow    Activity Tolerance:    fair  Please refer to the flowsheet for vital signs taken during this treatment. After treatment:   [x]  Patient left in no apparent distress sitting up in chair  []  Patient left in no apparent distress in bed  [x]  Call bell left within reach  [x]  Nursing notified  []  Caregiver present  []  Bed alarm activated    COMMUNICATION/EDUCATION:   [x] Role of Occupational Therapy in the acute care setting  [x] Home safety education was provided and the patient/caregiver indicated understanding. [x] Patient/family have participated as able in working towards goals and plan of care. [x] Patient/family agree to work toward stated goals and plan of care.   [] Patient understands intent and goals of therapy, but is neutral about his/her participation. [] Patient is unable to participate in goal setting and plan of care.       Thank you for this referral.  Jaiden Pulido  Time Calculation: 17 mins

## 2020-03-21 NOTE — PROGRESS NOTES
Bedside and Verbal shift change report given to Lc Rodriguez (oncoming nurse) by Kelechi Rivers (offgoing nurse). Report included the following information SBAR, Kardex, Procedure Summary, Intake/Output, MAR, Accordion, Recent Results, Med Rec Status and Quality Measures.

## 2020-03-21 NOTE — PROGRESS NOTES
Problem: Falls - Risk of  Goal: *Absence of Falls  Description: Document Daniela Bobby Fall Risk and appropriate interventions in the flowsheet. Outcome: Progressing Towards Goal  Note: Fall Risk Interventions:  Mobility Interventions: Patient to call before getting OOB         Medication Interventions: Evaluate medications/consider consulting pharmacy, Patient to call before getting OOB    Elimination Interventions: Call light in reach, Patient to call for help with toileting needs              Problem: Patient Education: Go to Patient Education Activity  Goal: Patient/Family Education  Outcome: Progressing Towards Goal     Problem: Pressure Injury - Risk of  Goal: *Prevention of pressure injury  Description: Document Rich Scale and appropriate interventions in the flowsheet. Outcome: Progressing Towards Goal  Note: Pressure Injury Interventions:             Activity Interventions: Pressure redistribution bed/mattress(bed type), PT/OT evaluation    Mobility Interventions: HOB 30 degrees or less, Pressure redistribution bed/mattress (bed type), PT/OT evaluation    Nutrition Interventions: Document food/fluid/supplement intake                     Problem: Patient Education: Go to Patient Education Activity  Goal: Patient/Family Education  Outcome: Progressing Towards Goal     Problem: Pain  Goal: *Control of Pain  Outcome: Progressing Towards Goal     Problem: Cellulitis Care Plan (Adult)  Goal: *Control of acute pain  Outcome: Progressing Towards Goal  Goal: *Skin integrity maintained  Outcome: Progressing Towards Goal  Goal: *Absence of infection signs and symptoms  Outcome: Progressing Towards Goal     Problem: Surgical Pathway Post-Op Day 1  Goal: Activity/Safety  Outcome: Progressing Towards Goal  Goal: Diagnostic Test/Procedures  Outcome: Progressing Towards Goal  Goal: Nutrition/Diet  Outcome: Progressing Towards Goal  Goal: Discharge Planning  Outcome: Progressing Towards Goal  Goal: Medications  Outcome: Progressing Towards Goal  Goal: Respiratory  Outcome: Progressing Towards Goal  Goal: Treatments/Interventions/Procedures  Outcome: Progressing Towards Goal  Goal: Psychosocial  Outcome: Progressing Towards Goal  Goal: *No signs and symptoms of infection or wound complications  Outcome: Progressing Towards Goal  Goal: *Optimal pain control at patient's stated goal  Outcome: Progressing Towards Goal  Goal: *Adequate urinary output (equal to or greater than 30 milliliters/hour)  Outcome: Progressing Towards Goal  Goal: *Hemodynamically stable  Outcome: Progressing Towards Goal  Goal: *Tolerating diet  Outcome: Progressing Towards Goal  Goal: *Demonstrates progressive activity  Outcome: Progressing Towards Goal  Goal: *Lungs clear or at baseline  Outcome: Progressing Towards Goal     Problem: Altered nutrition  Goal: *Optimize nutritional status  Outcome: Progressing Towards Goal

## 2020-03-21 NOTE — PROGRESS NOTES
Internal Medicine Progress Note        NAME: Aida Reese   :  1995  MRM:  [id number]    Date/Time: 3/21/2020        ASSESSMENT/PLAN:       #  Abscess of left thigh (3/19/2020). S/P surgery. Surgical management including wound care, pain control and DVT ppx per surgery. wound vac used. # Sepsis . due to thigh infection/abscess. Clinically stable. # Cellulitis of left thigh (3/17/2020). Abx as above     #  Prediabetes (3/18/2020)    Overview: HbA1c 6.0% on 3/17/2020. Lab Review:     Recent Labs     20  1530   WBC 20.1*   HGB 13.1   HCT 38.8        Recent Labs     20  0310 20  0420 20  1530    141 137   K 3.5 3.5 3.5    106 104   CO2 27 27 26   GLU 86 98 165*   BUN 10 10 12   CREA 0.64 0.61 0.83   CA 7.7* 7.7* 7.6*   INR  --   --  1.3*     Lab Results   Component Value Date/Time    Glucose (POC) 85 2020 07:31 AM    Glucose (POC) 92 2020 10:22 PM    Glucose (POC) 131 (H) 2020 05:28 PM    Glucose (POC) 105 2020 11:17 AM    Glucose (POC) 107 2020 10:54 PM     No results for input(s): PH, PCO2, PO2, HCO3, FIO2 in the last 72 hours. Recent Labs     20  1530   INR 1.3*       No results found for: YOUNG  Lab Results   Component Value Date/Time    Culture result: PENDING 2020 08:00 AM    Culture result: (A) 2020 05:21 PM     MODERATE * METHICILLIN RESISTANT STAPHYLOCOCCUS AUREUS *    Culture result: NO GROWTH 4 DAYS 2020 01:30 PM    Culture result: NO GROWTH 4 DAYS 2020 01:30 PM        Intervals noted      Subjective:     Chief Complaint:      Pain at surgery site  No other complain    ROS:  (bold if positive,otherwise negative)    Fever/chills ,  Dysuria   Cough , Sputum , SOB/LAKHANI , Chest Pain     Diarrhea ,Nausea/Vomit , Abd Pain , Constipation     Tolerating Diet                Objective:     Vitals:  Last 24hrs VS reviewed since prior progress note.  Most recent are:    Visit Vitals  /68 (BP 1 Location: Left arm, BP Patient Position: At rest)   Pulse 62   Temp 99.2 °F (37.3 °C)   Resp 18   Ht 5' 7\" (1.702 m)   Wt 82.2 kg (181 lb 3.2 oz)   SpO2 95%   BMI 28.38 kg/m²     SpO2 Readings from Last 6 Encounters:   03/21/20 95%    O2 Flow Rate (L/min): 10 l/min       Intake/Output Summary (Last 24 hours) at 3/21/2020 1132  Last data filed at 3/21/2020 0935  Gross per 24 hour   Intake 972.5 ml   Output 4051 ml   Net -3078.5 ml          Physical Exam:   Gen:  No distress, no complaint  Lungs:  Clear bilaterally, no wheeze or rhonchi  Heart:  Regular rate and rhythm, no murmurs or gallops  Abdomen:  Soft, non-tender, normal bowel sounds    Medications Reviewed: (see below)    Lab Data Reviewed: (see below)    ______________________________________________________________________    Medications:     Current Facility-Administered Medications   Medication Dose Route Frequency    enoxaparin (LOVENOX) injection 40 mg  40 mg SubCUTAneous Q24H    vancomycin (VANCOCIN) 1500 mg in  ml infusion  1,500 mg IntraVENous Q8H    VANCOMYCIN INFORMATION NOTE   Other ONCE    0.9% sodium chloride infusion 250 mL  250 mL IntraVENous PRN    0.9% sodium chloride infusion  50 mL/hr IntraVENous CONTINUOUS    sodium chloride (NS) flush 5-40 mL  5-40 mL IntraVENous Q8H    sodium chloride (NS) flush 5-40 mL  5-40 mL IntraVENous PRN    oxyCODONE-acetaminophen (PERCOCET) 5-325 mg per tablet 1 Tab  1 Tab Oral Q4H PRN    oxyCODONE-acetaminophen (PERCOCET 10)  mg per tablet 2 Tab  2 Tab Oral Q4H PRN    morphine injection 2 mg  2 mg IntraVENous Q4H PRN    insulin lispro (HUMALOG) injection   SubCUTAneous AC&HS    sodium chloride (NS) flush 5-10 mL  5-10 mL IntraVENous PRN    ondansetron (ZOFRAN) injection 4 mg  4 mg IntraVENous Q4H PRN    glucose chewable tablet 16 g  4 Tab Oral PRN    glucagon (GLUCAGEN) injection 1 mg  1 mg IntraMUSCular PRN    dextrose 10% infusion 125-250 mL  125-250 mL IntraVENous PRN    albuterol-ipratropium (DUO-NEB) 2.5 MG-0.5 MG/3 ML  3 mL Nebulization Q6H PRN    docusate sodium (COLACE) capsule 100 mg  100 mg Oral BID PRN    melatonin tablet 12 mg  12 mg Oral QHS PRN    piperacillin-tazobactam (ZOSYN) 3.375 g in 0.9% sodium chloride (MBP/ADV) 100 mL MBP - Extended dosing interval  3.375 g IntraVENous Q8H    VANCOMYCIN INFORMATION NOTE 1 Each  1 Each Other Rx Dosing/Monitoring    naloxone (NARCAN) injection 0.4 mg  0.4 mg IntraVENous PRN          Total time spent with patient: 25 minutes                  Care Plan discussed with: Patient, Nursing Staff and >50% of time spent in counseling and coordination of care    Discussed:  Care Plan       Disposition:  Home w/Family           This document in whole or part of it has been produced using voice recognition software. Unrecognized errors in transcription may be present.     Attending Physician: Yoan Marc MD

## 2020-03-21 NOTE — PROGRESS NOTES
0900  Up  By PT to the recliner, no weight bearing right leg. 1100  Assisted to the bathroom with walker, no weight bearing of the  Right leg.    1300  Sitting on the recliner, IS encourage, raised up to 2000 ml.    1430  BM, medicated once earlier with oral pain med, with good relief. 1800   Been  On the recliner.

## 2020-03-21 NOTE — PROGRESS NOTES
Progress Note    Patient: Leldon Hodgkins MRN: [id number]  SSN: [id number]    YOB: 1995  Age: 26 y.o. Sex: male      Admit Date: 3/17/2020    LOS: 2 days     Subjective:     Leldon Hodgkins is a 25 yo M with left thigh pain. Pt is s/p Incision and drainage of the left thigh with delayed primary closure and deep drain placement and Incision and drainage of left gluteal region. Pt has no new orthopedic complaints. Objective:     Vitals:    03/20/20 1731 03/21/20 0011 03/21/20 0435 03/21/20 0732   BP: 120/81 115/68 110/69 123/68   Pulse: 96 71 (!) 58 62   Resp: 16 16 16 18   Temp: 98.3 °F (36.8 °C) 98 °F (36.7 °C) 97.9 °F (36.6 °C) 99.2 °F (37.3 °C)   SpO2: 95% 95% 99% 95%   Weight:       Height:            Intake and Output:  Current Shift: 03/21 0701 - 03/21 1900  In: 120 [P.O.:120]  Out: 801 [Urine:800]  Last three shifts: 03/19 1901 - 03/21 0700  In: 2460 [P.O.:360; I.V.:2100]  Out: 1300 [Urine:4450; Drains:100]    Physical Exam:   GENERAL: alert, cooperative, no distress, appears stated age  EXTREMITIES: incision over left thigh covered and CDI. Drain in place with minimal bloody yellow drainage. Denies calf pain. NEUROLOGIC: AOx3. Has limited ROM in Left LE secondary to surgery. He is able to plantar and dorsiflex his left ankle. Neurovascularly intact with a 2+ DP pulse. Has sensation to light touch. PSYCHIATRIC: non focal    Lab/Data Review: All lab results for the last 24 hours reviewed. No new orthopedic imaging today. Assessment:     1. Sepsis   2. Left thigh abscess and left gluteus julius myositis. 3. S/P  Incision and drainage of the left thigh with delayed primary closure and deep drain placement and Incision and drainage of left gluteal region. Plan:     1. WBAT. Continue PT. Mobilize. 2. Pain control  3. Hemovac until Sunday  4.  Cont IV Vanc, Cx + moderate MRSA        Signed By: SIMON Ortiz     March 21, 2020

## 2020-03-22 LAB
ANION GAP SERPL CALC-SCNC: 7 MMOL/L (ref 3–18)
BACTERIA SPEC ANAEROBE CULT: NORMAL
BUN SERPL-MCNC: 9 MG/DL (ref 7–18)
BUN/CREAT SERPL: 14 (ref 12–20)
CALCIUM SERPL-MCNC: 8 MG/DL (ref 8.5–10.1)
CHLORIDE SERPL-SCNC: 106 MMOL/L (ref 100–111)
CO2 SERPL-SCNC: 29 MMOL/L (ref 21–32)
CREAT SERPL-MCNC: 0.63 MG/DL (ref 0.6–1.3)
GLUCOSE BLD STRIP.AUTO-MCNC: 104 MG/DL (ref 70–110)
GLUCOSE BLD STRIP.AUTO-MCNC: 116 MG/DL (ref 70–110)
GLUCOSE BLD STRIP.AUTO-MCNC: 119 MG/DL (ref 70–110)
GLUCOSE BLD STRIP.AUTO-MCNC: 87 MG/DL (ref 70–110)
GLUCOSE SERPL-MCNC: 94 MG/DL (ref 74–99)
MAGNESIUM SERPL-MCNC: 2.2 MG/DL (ref 1.6–2.6)
PHOSPHATE SERPL-MCNC: 4.3 MG/DL (ref 2.5–4.9)
POTASSIUM SERPL-SCNC: 3.9 MMOL/L (ref 3.5–5.5)
SODIUM SERPL-SCNC: 142 MMOL/L (ref 136–145)
SPECIMEN SOURCE: NORMAL

## 2020-03-22 PROCEDURE — 80048 BASIC METABOLIC PNL TOTAL CA: CPT

## 2020-03-22 PROCEDURE — 82962 GLUCOSE BLOOD TEST: CPT

## 2020-03-22 PROCEDURE — 84100 ASSAY OF PHOSPHORUS: CPT

## 2020-03-22 PROCEDURE — 74011250636 HC RX REV CODE- 250/636: Performed by: PHYSICIAN ASSISTANT

## 2020-03-22 PROCEDURE — 74011250636 HC RX REV CODE- 250/636: Performed by: ORTHOPAEDIC SURGERY

## 2020-03-22 PROCEDURE — 74011000258 HC RX REV CODE- 258: Performed by: ORTHOPAEDIC SURGERY

## 2020-03-22 PROCEDURE — 65270000029 HC RM PRIVATE

## 2020-03-22 PROCEDURE — 36415 COLL VENOUS BLD VENIPUNCTURE: CPT

## 2020-03-22 PROCEDURE — 83735 ASSAY OF MAGNESIUM: CPT

## 2020-03-22 PROCEDURE — 74011250636 HC RX REV CODE- 250/636: Performed by: HOSPITALIST

## 2020-03-22 PROCEDURE — 74011250637 HC RX REV CODE- 250/637: Performed by: ORTHOPAEDIC SURGERY

## 2020-03-22 RX ADMIN — VANCOMYCIN HYDROCHLORIDE 1500 MG: 10 INJECTION, POWDER, LYOPHILIZED, FOR SOLUTION INTRAVENOUS at 19:09

## 2020-03-22 RX ADMIN — SODIUM CHLORIDE 50 ML/HR: 900 INJECTION, SOLUTION INTRAVENOUS at 15:06

## 2020-03-22 RX ADMIN — VANCOMYCIN HYDROCHLORIDE 1500 MG: 10 INJECTION, POWDER, LYOPHILIZED, FOR SOLUTION INTRAVENOUS at 10:08

## 2020-03-22 RX ADMIN — PIPERACILLIN AND TAZOBACTAM 3.38 G: 3; .375 INJECTION, POWDER, LYOPHILIZED, FOR SOLUTION INTRAVENOUS at 19:03

## 2020-03-22 RX ADMIN — Medication 10 ML: at 13:59

## 2020-03-22 RX ADMIN — PIPERACILLIN AND TAZOBACTAM 3.38 G: 3; .375 INJECTION, POWDER, LYOPHILIZED, FOR SOLUTION INTRAVENOUS at 03:44

## 2020-03-22 RX ADMIN — OXYCODONE HYDROCHLORIDE AND ACETAMINOPHEN 1 TABLET: 5; 325 TABLET ORAL at 10:04

## 2020-03-22 RX ADMIN — OXYCODONE HYDROCHLORIDE AND ACETAMINOPHEN 1 TABLET: 5; 325 TABLET ORAL at 16:35

## 2020-03-22 RX ADMIN — OXYCODONE HYDROCHLORIDE AND ACETAMINOPHEN 1 TABLET: 5; 325 TABLET ORAL at 04:50

## 2020-03-22 RX ADMIN — PIPERACILLIN AND TAZOBACTAM 3.38 G: 3; .375 INJECTION, POWDER, LYOPHILIZED, FOR SOLUTION INTRAVENOUS at 10:55

## 2020-03-22 RX ADMIN — Medication 10 ML: at 23:43

## 2020-03-22 RX ADMIN — Medication 10 ML: at 06:00

## 2020-03-22 RX ADMIN — VANCOMYCIN HYDROCHLORIDE 1500 MG: 10 INJECTION, POWDER, LYOPHILIZED, FOR SOLUTION INTRAVENOUS at 02:34

## 2020-03-22 RX ADMIN — OXYCODONE HYDROCHLORIDE AND ACETAMINOPHEN 2 TABLET: 10; 325 TABLET ORAL at 00:26

## 2020-03-22 RX ADMIN — ENOXAPARIN SODIUM 40 MG: 40 INJECTION SUBCUTANEOUS at 16:29

## 2020-03-22 RX ADMIN — OXYCODONE HYDROCHLORIDE AND ACETAMINOPHEN 1 TABLET: 5; 325 TABLET ORAL at 20:40

## 2020-03-22 NOTE — PROGRESS NOTES
Progress Note    Patient: Iveth Caal MRN: [id number]  SSN: [id number]    YOB: 1995  Age: 26 y.o. Sex: male      Admit Date: 3/17/2020    LOS: 3 days     Subjective:     Iveth Caal is a 25 yo M with left thigh pain. Pt is s/p Incision and drainage of the left thigh with delayed primary closure and deep drain placement and Incision and drainage of left gluteal region. Drain removed at bedside today. Pt has no new orthopedic complaints. Pt denies fever or chills. Objective:     Vitals:    03/22/20 0533 03/22/20 0722 03/22/20 0836 03/22/20 1000   BP: 128/74 126/71  110/62   Pulse: 60 (!) 56  78   Resp: 16 18  16   Temp: 97.7 °F (36.5 °C) 97.8 °F (36.6 °C)  98.3 °F (36.8 °C)   SpO2: 97% 97%  98%   Weight:   79 kg (174 lb 1.6 oz)    Height:            Intake and Output:  Current Shift: 03/22 0701 - 03/22 1900  In: 240 [P.O.:240]  Out: 600 [Urine:600]  Last three shifts: 03/20 1901 - 03/22 0700  In: 3416.7 [P.O.:1050; I.V.:2366.7]  Out: 4921 [Urine:4900; Drains:20]    Physical Exam:   GENERAL: alert, cooperative, no distress, appears stated age  EXTREMITIES: incision over left thigh covered and CDI. Drain in place with minimal bloody yellow drainage. Denies calf pain. NEUROLOGIC: AOx3. Has limited ROM in Left LE secondary to surgery. He is able to plantar and dorsiflex his left ankle. Neurovascularly intact with a 2+ DP pulse. Has sensation to light touch. PSYCHIATRIC: non focal    Lab/Data Review: All lab results for the last 24 hours reviewed. No new orthopedic imaging today. Assessment:     1. Sepsis   2.  Left thigh abscess and left gluteus julius myositis. 3. S/P  Incision and drainage of the left thigh with delayed primary closure and deep drain placement and Incision and drainage of left gluteal region. Plan:     1. WBAT. Continue PT. Mobilize. 2. Pain control  3. Hemovac removed today. Tegaderm applied.   4. Cont IV Vanc, Cx + moderate MRSA    Signed By: SIMON Acosta     March 22, 2020

## 2020-03-22 NOTE — PROGRESS NOTES
Patient received in bed awake. Patient A&Ox4, denies pain and discomfort. immobilizer to left leg with hemovac with small amount of serous drainage. Patient Syriac speaking and English with blue  phone to bedside. No distress noted. Frequently use items within reach. Bed locked in low position. Call bell within reach and Patient verbalized understanding of use for assistance and needs. 1004- Patient given Percocet 5-325 mg 1 tab PO for pain 5/10. See MAR and left leg pain assessments. 12- Contact isolation started for MRSA in surgical wound. 1508- Patient made aware of Contact isolation for MRSA in wound and given  teaching from [employer] about MRSA in sheet in Syriac; voiced understanding. Call bell w/in reach. 1520- Patient given FAQ's (Frequently asked questions) about MRSA. Voiced understanding. Call bell remains w/in reach. 1635-  Patient given Percocet 5-325 mg 1 tab PO for pain 4/10. See MAR and left leg pain assessments.

## 2020-03-22 NOTE — PROGRESS NOTES
1926: Patient in bed awake,alert and oriented x4. No signs of distress. Bed low and locked. Call bell within reach. Will continue monitoring. 9900: In bed sleeping,uneventful night,no other concern at this time,call bell within reach. Will continue monitoring.   Patient Vitals for the past 12 hrs:   Temp Pulse Resp BP SpO2   03/22/20 0533 97.7 °F (36.5 °C) 60 16 128/74 97 %   03/22/20 0041 98.2 °F (36.8 °C) 73 16 127/71 96 %   03/21/20 2014 98.3 °F (36.8 °C) 67 16 132/79 98 %

## 2020-03-22 NOTE — PROGRESS NOTES
Internal Medicine Progress Note        NAME: Bebe Lopez   :  1995  MRM:  [id number]    Date/Time: 3/22/2020        ASSESSMENT/PLAN:       #  Abscess of left thigh (3/19/2020). S/P surgery. Surgical management including wound care, pain control and DVT ppx per surgery. wound vac used. Ortho  recommendations: WBAT. Continue PT. Mobilize. 2. Pain control 3. Hemovac removed. Tegaderm applied. 4. Cont IV Vanc, Cx + moderate MRSA    # Sepsis . due to thigh infection/abscess. Clinically stable. Hemodynamically stable. # Cellulitis of left thigh (3/17/2020). Abx as above     #  Prediabetes (3/18/2020)    Overview: HbA1c 6.0% on 3/17/2020. Lab Review:     Recent Labs     20  1530   WBC 20.1*   HGB 13.1   HCT 38.8        Recent Labs     20  0435 20  0310 20  0420 20  1530    141 141 137   K 3.9 3.5 3.5 3.5    105 106 104   CO2 29 27 27 26   GLU 94 86 98 165*   BUN 9 10 10 12   CREA 0.63 0.64 0.61 0.83   CA 8.0* 7.7* 7.7* 7.6*   MG 2.2  --   --   --    PHOS 4.3  --   --   --    INR  --   --   --  1.3*     Lab Results   Component Value Date/Time    Glucose (POC) 116 (H) 2020 11:49 AM    Glucose (POC) 87 2020 06:27 AM    Glucose (POC) 88 2020 09:27 PM    Glucose (POC) 126 (H) 2020 03:52 PM    Glucose (POC) 126 (H) 2020 11:44 AM     No results for input(s): PH, PCO2, PO2, HCO3, FIO2 in the last 72 hours. Recent Labs     20  1530   INR 1.3*       No results found for: SDES  Lab Results   Component Value Date/Time    Culture result: NO GROWTH 2 DAYS 2020 08:00 AM    Culture result: (A) 2020 05:21 PM     MODERATE * METHICILLIN RESISTANT STAPHYLOCOCCUS AUREUS *    Culture result: NO GROWTH 5 DAYS 2020 01:30 PM    Culture result: NO GROWTH 5 DAYS 2020 01:30 PM        Intervals noted      Subjective:     Chief Complaint:      Pain at surgery site improving. No other complain  Eating good. Passing bowel movement. Urinating well. ROS:  (bold if positive,otherwise negative)    Fever/chills ,  Dysuria   Cough , Sputum , SOB/LAKHANI , Chest Pain     Diarrhea ,Nausea/Vomit , Abd Pain , Constipation     Tolerating Diet                Objective:     Vitals:  Last 24hrs VS reviewed since prior progress note. Most recent are:    Visit Vitals  /73 (BP 1 Location: Left arm, BP Patient Position: At rest)   Pulse 80   Temp 98.1 °F (36.7 °C)   Resp 18   Ht 5' 7\" (1.702 m)   Wt 174 lb 1.6 oz (79 kg)   SpO2 97%   BMI 27.27 kg/m²     SpO2 Readings from Last 6 Encounters:   03/22/20 97%    O2 Flow Rate (L/min): 10 l/min       Intake/Output Summary (Last 24 hours) at 3/22/2020 1402  Last data filed at 3/22/2020 1259  Gross per 24 hour   Intake 3536.67 ml   Output 4320 ml   Net -783.33 ml          Physical Exam:   Gen:  No distress, no complaint  Lungs:  Clear bilaterally, no wheeze or rhonchi  Heart:  Regular rate and rhythm, no murmurs or gallops  Abdomen:  Soft, non-tender, normal bowel sounds  Left thigh tenderness, swelling improved. Cellulitis improved. Drain out.     Medications Reviewed: (see below)    Lab Data Reviewed: (see below)    ______________________________________________________________________    Medications:     Current Facility-Administered Medications   Medication Dose Route Frequency    [START ON 3/23/2020] VANCOMYCIN INFORMATION NOTE   Other ONCE    enoxaparin (LOVENOX) injection 40 mg  40 mg SubCUTAneous Q24H    vancomycin (VANCOCIN) 1500 mg in  ml infusion  1,500 mg IntraVENous Q8H    0.9% sodium chloride infusion 250 mL  250 mL IntraVENous PRN    0.9% sodium chloride infusion  50 mL/hr IntraVENous CONTINUOUS    sodium chloride (NS) flush 5-40 mL  5-40 mL IntraVENous Q8H    sodium chloride (NS) flush 5-40 mL  5-40 mL IntraVENous PRN    oxyCODONE-acetaminophen (PERCOCET) 5-325 mg per tablet 1 Tab  1 Tab Oral Q4H PRN    oxyCODONE-acetaminophen (PERCOCET 10)  mg per tablet 2 Tab  2 Tab Oral Q4H PRN    morphine injection 2 mg  2 mg IntraVENous Q4H PRN    insulin lispro (HUMALOG) injection   SubCUTAneous AC&HS    sodium chloride (NS) flush 5-10 mL  5-10 mL IntraVENous PRN    ondansetron (ZOFRAN) injection 4 mg  4 mg IntraVENous Q4H PRN    glucose chewable tablet 16 g  4 Tab Oral PRN    glucagon (GLUCAGEN) injection 1 mg  1 mg IntraMUSCular PRN    dextrose 10% infusion 125-250 mL  125-250 mL IntraVENous PRN    albuterol-ipratropium (DUO-NEB) 2.5 MG-0.5 MG/3 ML  3 mL Nebulization Q6H PRN    docusate sodium (COLACE) capsule 100 mg  100 mg Oral BID PRN    melatonin tablet 12 mg  12 mg Oral QHS PRN    piperacillin-tazobactam (ZOSYN) 3.375 g in 0.9% sodium chloride (MBP/ADV) 100 mL MBP - Extended dosing interval  3.375 g IntraVENous Q8H    VANCOMYCIN INFORMATION NOTE 1 Each  1 Each Other Rx Dosing/Monitoring    naloxone (NARCAN) injection 0.4 mg  0.4 mg IntraVENous PRN          Total time spent with patient: 25 minutes                  Care Plan discussed with: Patient, Nursing Staff and >50% of time spent in counseling and coordination of care    Discussed:  Care Plan       Disposition:  Home w/Family           This document in whole or part of it has been produced using voice recognition software. Unrecognized errors in transcription may be present.     Attending Physician: Marcus Phan MD

## 2020-03-22 NOTE — PROGRESS NOTES
Problem: Falls - Risk of  Goal: *Absence of Falls  Description: Document Daniela Bobby Fall Risk and appropriate interventions in the flowsheet. Outcome: Progressing Towards Goal  Note: Fall Risk Interventions:  Mobility Interventions: Patient to call before getting OOB, Utilize walker, cane, or other assistive device, Utilize gait belt for transfers/ambulation         Medication Interventions: Patient to call before getting OOB, Teach patient to arise slowly    Elimination Interventions: Call light in reach              Problem: Patient Education: Go to Patient Education Activity  Goal: Patient/Family Education  Outcome: Progressing Towards Goal     Problem: Pressure Injury - Risk of  Goal: *Prevention of pressure injury  Description: Document Rich Scale and appropriate interventions in the flowsheet. Outcome: Progressing Towards Goal  Note: Pressure Injury Interventions:             Activity Interventions: Increase time out of bed, Pressure redistribution bed/mattress(bed type)    Mobility Interventions: HOB 30 degrees or less, Pressure redistribution bed/mattress (bed type), PT/OT evaluation    Nutrition Interventions: Document food/fluid/supplement intake                     Problem: Patient Education: Go to Patient Education Activity  Goal: Patient/Family Education  Outcome: Progressing Towards Goal     Problem: Pain  Goal: *Control of Pain  Outcome: Progressing Towards Goal     Problem: Cellulitis Care Plan (Adult)  Goal: *Control of acute pain  Outcome: Progressing Towards Goal  Goal: *Skin integrity maintained  Outcome: Progressing Towards Goal  Goal: *Absence of infection signs and symptoms  Outcome: Progressing Towards Goal     Problem: Altered nutrition  Goal: *Optimize nutritional status  Outcome: Progressing Towards Goal     Problem: Patient Education: Go to Patient Education Activity  Goal: Patient/Family Education  Outcome: Progressing Towards Goal     Problem: Patient Education: Go to Patient Education Activity  Goal: Patient/Family Education  Outcome: Progressing Towards Goal     Problem: Surgical Pathway Post-Op Day 1  Goal: Off Pathway (Use only if patient is Off Pathway)  Outcome: Progressing Towards Goal  Goal: Activity/Safety  Outcome: Progressing Towards Goal  Goal: Diagnostic Test/Procedures  Outcome: Progressing Towards Goal  Goal: Nutrition/Diet  Outcome: Progressing Towards Goal  Goal: Discharge Planning  Outcome: Progressing Towards Goal  Goal: Medications  Outcome: Progressing Towards Goal  Goal: Respiratory  Outcome: Progressing Towards Goal  Goal: Treatments/Interventions/Procedures  Outcome: Progressing Towards Goal  Goal: Psychosocial  Outcome: Progressing Towards Goal  Goal: *No signs and symptoms of infection or wound complications  Outcome: Progressing Towards Goal  Goal: *Optimal pain control at patient's stated goal  Outcome: Progressing Towards Goal  Goal: *Adequate urinary output (equal to or greater than 30 milliliters/hour)  Outcome: Progressing Towards Goal  Goal: *Hemodynamically stable  Outcome: Progressing Towards Goal  Goal: *Tolerating diet  Outcome: Progressing Towards Goal  Goal: *Demonstrates progressive activity  Outcome: Progressing Towards Goal  Goal: *Lungs clear or at baseline  Outcome: Progressing Towards Goal

## 2020-03-22 NOTE — ROUTINE PROCESS
Bedside and Verbal shift change report given to Yulissa RN (oncoming nurse) by Eugene Walters RN, BSN (offgoing nurse). Report given with SBAR, Kardex, Intake/Output, MAR and Recent Results.

## 2020-03-23 VITALS
HEART RATE: 74 BPM | SYSTOLIC BLOOD PRESSURE: 123 MMHG | RESPIRATION RATE: 18 BRPM | OXYGEN SATURATION: 98 % | HEIGHT: 67 IN | BODY MASS INDEX: 27.31 KG/M2 | DIASTOLIC BLOOD PRESSURE: 73 MMHG | TEMPERATURE: 98.5 F | WEIGHT: 174 LBS

## 2020-03-23 LAB
ANION GAP SERPL CALC-SCNC: 6 MMOL/L (ref 3–18)
BACTERIA SPEC CULT: NORMAL
BACTERIA SPEC CULT: NORMAL
BUN SERPL-MCNC: 9 MG/DL (ref 7–18)
BUN/CREAT SERPL: 15 (ref 12–20)
CALCIUM SERPL-MCNC: 8.5 MG/DL (ref 8.5–10.1)
CHLORIDE SERPL-SCNC: 106 MMOL/L (ref 100–111)
CO2 SERPL-SCNC: 29 MMOL/L (ref 21–32)
CREAT SERPL-MCNC: 0.6 MG/DL (ref 0.6–1.3)
DATE LAST DOSE: ABNORMAL
GLUCOSE BLD STRIP.AUTO-MCNC: 140 MG/DL (ref 70–110)
GLUCOSE BLD STRIP.AUTO-MCNC: 90 MG/DL (ref 70–110)
GLUCOSE SERPL-MCNC: 90 MG/DL (ref 74–99)
POTASSIUM SERPL-SCNC: 3.8 MMOL/L (ref 3.5–5.5)
REPORTED DOSE,DOSE: ABNORMAL UNITS
REPORTED DOSE/TIME,TMG: 200
SERVICE CMNT-IMP: NORMAL
SERVICE CMNT-IMP: NORMAL
SODIUM SERPL-SCNC: 141 MMOL/L (ref 136–145)
VANCOMYCIN TROUGH SERPL-MCNC: 23.4 UG/ML (ref 10–20)

## 2020-03-23 PROCEDURE — 80048 BASIC METABOLIC PNL TOTAL CA: CPT

## 2020-03-23 PROCEDURE — 36415 COLL VENOUS BLD VENIPUNCTURE: CPT

## 2020-03-23 PROCEDURE — 74011250637 HC RX REV CODE- 250/637: Performed by: ORTHOPAEDIC SURGERY

## 2020-03-23 PROCEDURE — 74011250636 HC RX REV CODE- 250/636: Performed by: ORTHOPAEDIC SURGERY

## 2020-03-23 PROCEDURE — 82962 GLUCOSE BLOOD TEST: CPT

## 2020-03-23 PROCEDURE — 74011250636 HC RX REV CODE- 250/636: Performed by: HOSPITALIST

## 2020-03-23 PROCEDURE — 97535 SELF CARE MNGMENT TRAINING: CPT

## 2020-03-23 PROCEDURE — 80202 ASSAY OF VANCOMYCIN: CPT

## 2020-03-23 PROCEDURE — 74011000258 HC RX REV CODE- 258: Performed by: ORTHOPAEDIC SURGERY

## 2020-03-23 PROCEDURE — 97116 GAIT TRAINING THERAPY: CPT

## 2020-03-23 RX ORDER — OXYCODONE AND ACETAMINOPHEN 5; 325 MG/1; MG/1
1 TABLET ORAL
Qty: 30 TAB | Refills: 0 | Status: SHIPPED | OUTPATIENT
Start: 2020-03-23 | End: 2020-03-28

## 2020-03-23 RX ORDER — DOXYCYCLINE 100 MG/1
100 TABLET ORAL 2 TIMES DAILY
Qty: 4 TAB | Refills: 0 | Status: SHIPPED | OUTPATIENT
Start: 2020-03-23 | End: 2020-03-25

## 2020-03-23 RX ORDER — VANCOMYCIN HYDROCHLORIDE 1 G/20ML
INJECTION, POWDER, LYOPHILIZED, FOR SOLUTION INTRAVENOUS
Status: DISCONTINUED
Start: 2020-03-23 | End: 2020-03-23 | Stop reason: WASHOUT

## 2020-03-23 RX ORDER — ENOXAPARIN SODIUM 100 MG/ML
40 INJECTION SUBCUTANEOUS EVERY 24 HOURS
Qty: 14 SYRINGE | Refills: 0 | Status: SHIPPED | OUTPATIENT
Start: 2020-03-23

## 2020-03-23 RX ADMIN — OXYCODONE HYDROCHLORIDE AND ACETAMINOPHEN 1 TABLET: 5; 325 TABLET ORAL at 11:36

## 2020-03-23 RX ADMIN — Medication 10 ML: at 14:00

## 2020-03-23 RX ADMIN — ENOXAPARIN SODIUM 40 MG: 40 INJECTION SUBCUTANEOUS at 15:31

## 2020-03-23 RX ADMIN — VANCOMYCIN HYDROCHLORIDE 1500 MG: 10 INJECTION, POWDER, LYOPHILIZED, FOR SOLUTION INTRAVENOUS at 10:24

## 2020-03-23 RX ADMIN — PIPERACILLIN AND TAZOBACTAM 3.38 G: 3; .375 INJECTION, POWDER, LYOPHILIZED, FOR SOLUTION INTRAVENOUS at 03:30

## 2020-03-23 RX ADMIN — PIPERACILLIN AND TAZOBACTAM 3.38 G: 3; .375 INJECTION, POWDER, LYOPHILIZED, FOR SOLUTION INTRAVENOUS at 10:30

## 2020-03-23 RX ADMIN — VANCOMYCIN HYDROCHLORIDE 1500 MG: 10 INJECTION, POWDER, LYOPHILIZED, FOR SOLUTION INTRAVENOUS at 02:11

## 2020-03-23 NOTE — DISCHARGE SUMMARY
Discharge Summary      [address] service    Patient ID:  Cristobal Mc, 26 y.o., male  : 1995    Admit Date: 3/17/2020  Discharge Date:  3/23/2020  Length of stay: 4 day(s)    PCP:  Ricky Craig MD    Chief Complaint   Patient presents with    Skin Infection     Discharge Diagnosis:     Hospital Problems  Date Reviewed: 3/18/2020          Codes Class Noted POA    Abscess of left thigh ICD-10-CM: L02.416  ICD-9-CM: 682.6  3/19/2020 Yes        Thigh abscess ICD-10-CM: L02.419  ICD-9-CM: 682.6  3/19/2020 Unknown        Prediabetes ICD-10-CM: R73.03  ICD-9-CM: 790.29  3/18/2020 Yes    Overview Signed 3/18/2020  6:53 AM by Elizabeth CAAL, DO     HbA1c 6.0% on 3/17/2020. * (Principal) Sepsis ([address] ICD-10-CM: A41.9  ICD-9-CM: 038.9, 995.91  3/17/2020 Yes        Cellulitis of left thigh ICD-10-CM: L03.116  ICD-9-CM: 682.6  3/17/2020 Yes              Discharge instructions:    #  Discharge Diet:            Diet: Diabetic Diet  # Discharge activity and restrictions: See surgical instructions and wound care per surgery team.    # Follow-up appointments: Follow-up Information     Follow up With Specialties Details Why Sundra Hamman, MD Internal Medicine On 4/10/2020 at 2;30 pm [facility] [address]  [address] [place], [place] [address] [phone]             HPI on Admission (per admitting physician):  Cristobal Mc is a 22 y.o. male who presents to [facility] ER with complaint of Swollen Left Leg. Patient states that he has had gradually worsening Left Thigh pain for approximately 5 days accompanied by swelling and drainage from a \"boil\" that form on the anterior aspect of this thigh. Patient reports fever, fatigue, weakness, and generally feelings of malaise accompanying thigh swelling. Patient states that he had taken Aleve for his pain. Patient denies chills, nausea, vomiting, diarrhea, dysuria, sick contacts, and previous skin infections.   Furthermore, Patient denies previous medical diagnoses and surgeries. Notably, Patient's Primary language is Amharic and translations were performed by an Interpretor over the phone (#[id number]). For further details and initial management please refer to H/P. Hospital Course:    Patient feels good He offer  no complaint. Pain and swelling in his left thigh improving. Seen by orthopedic team and indicated he is okay for discharge with antibiotic arranged. Seen by ID who recommended 2 more days of p.o. doxycycline after discharge. By Problems :      #  Abscess of left thigh (3/19/2020). S/P surgery. Surgical management including wound care, pain control and DVT ppx per surgery. wound vac used. Ortho  recommendations  WBAT.       # Sepsis . due to thigh infection/abscess. Clinically stable. Hemodynamically stable.      # Cellulitis of left thigh (3/17/2020). Abx as above      #  Prediabetes (3/18/2020)    Overview: HbA1c 6.0% on 3/17/2020. Discharge condition:  improved and stable    Disposition:  Home    Procedures:   Procedure(s):  EXTREMITY IRRIGATION AND DEBRIDEMENT OF LEFT THIGH ABSCESS      Consultants: ID and Orthopedic Surgery  None    Physical Exam on Discharge:  Visit Vitals  /73 (BP 1 Location: Left arm, BP Patient Position: At rest)   Pulse 74   Temp 98.5 °F (36.9 °C)   Resp 18   Ht 5' 7\" (1.702 m)   Wt 79 kg (174 lb 1.6 oz)   SpO2 98%   BMI 27.27 kg/m²   Gen:  No distress, no complaint  Lungs:  Clear bilaterally, no wheeze or rhonchi  Heart:  Regular rate and rhythm, no murmurs or gallops  Abdomen:  Soft, non-tender, normal bowel sounds  Left thigh tenderness, swelling improved. Cellulitis improved. Drain out. Discharge medications :  Current Discharge Medication List      START taking these medications    Details   enoxaparin (LOVENOX) 40 mg/0.4 mL 0.4 mL by SubCUTAneous route every twenty-four (24) hours.   Qty: 14 Syringe, Refills: 0      oxyCODONE-acetaminophen (PERCOCET) 5-325 mg per tablet Take 1 Tab by mouth every four (4) hours as needed for Pain for up to 5 days. Max Daily Amount: 6 Tabs. Qty: 30 Tab, Refills: 0    Associated Diagnoses: Cellulitis of left thigh      doxycycline (ADOXA) 100 mg tablet Take 1 Tab by mouth two (2) times a day for 2 days. Qty: 4 Tab, Refills: 0                 Most Recent Labs:       No results for input(s): WBC, HGB, HCT, PLT, HGBEXT, HCTEXT, PLTEXT in the last 72 hours. Recent Labs     03/23/20  0425 03/22/20  0435 03/21/20  0310    142 141   K 3.8 3.9 3.5    106 105   CO2 29 29 27   GLU 90 94 86   BUN 9 9 10   CREA 0.60 0.63 0.64   CA 8.5 8.0* 7.7*   MG  --  2.2  --    PHOS  --  4.3  --      Lab Results   Component Value Date/Time    Glucose (POC) 90 03/23/2020 11:25 AM    Glucose (POC) 140 (H) 03/23/2020 08:29 AM    Glucose (POC) 104 03/22/2020 09:28 PM    Glucose (POC) 119 (H) 03/22/2020 03:27 PM    Glucose (POC) 116 (H) 03/22/2020 11:49 AM     No results for input(s): PH, PCO2, PO2, HCO3, FIO2 in the last 72 hours. No results for input(s): INR, INREXT in the last 72 hours. No results found for: [facility]  Lab Results   Component Value Date/Time    Culture result: NO GROWTH 3 DAYS 03/20/2020 08:00 AM    Culture result: (A) 03/18/2020 05:21 PM     MODERATE * METHICILLIN RESISTANT STAPHYLOCOCCUS AUREUS *    Culture result: NO GROWTH 6 DAYS 03/17/2020 01:30 PM    Culture result: NO GROWTH 6 DAYS 03/17/2020 01:30 PM      XR Results:  Results from [place] encounter on 03/17/20   XR FEMUR LT 2 V    Narrative EXAM: XR FEMUR LT 2 V    CLINICAL INDICATION/HISTORY: infection  -Additional: None    COMPARISON: None    TECHNIQUE: 2 views of the left left femur     _______________    FINDINGS:    BONES: No evidence of acute fracture or dislocation. Joint spaces and alignment  are maintained. No area of erosion or aggressive-appearing bone destruction. Candance Huger     SOFT TISSUES: Soft tissue swelling about the lateral thigh    _______________ Impression IMPRESSION:    No osseous abnormality. Soft tissue swelling about the lateral thigh. CT Results:  Results from [place] encounter on 03/17/20   CT LOW EXT LT W CONT    Narrative EXAM: CT of the left lower extremity without IV contrast    INDICATION: Left thigh cellulitis for 5 days    COMPARISON: Left femur x-rays dated March 17, 2020    TECHNIQUE: Axial CT imaging of the left thigh was performed without intravenous  contrast. Multiplanar reformats were generated. One or more dose reduction  techniques were used on this CT: automated exposure control, adjustment of the  mAs and/or kVp according to patient size, and iterative reconstruction  techniques. The specific techniques used on this CT exam have been documented  in the patient's electronic medical record. Digital Imaging and Communications  in Medicine (DICOM) format image data are available to nonaffiliated external  healthcare facilities or entities on a secure, media free, reciprocally  searchable basis with patient authorization for at least a 12-month period after  this study. _______________    FINDINGS:    Osseous structures: Visualized left SI joint is intact. Pubic symphysis is  intact. The bone mineralization is normal. There are no acute fractures or  subluxation. There is no periosteal reaction. No erosions seen to suggest  osteomyelitis. Soft tissue windows: Soft tissues the pelvis demonstrate urinary bladder to be  under distended. There is no free fluid seen. There are enlarged left groin  lymph nodes measuring up to 12 mm, which may be reactive. There is also a 15 mm  left iliac chain lymph node which may also be reactive. There is facial fat  stranding along the left side the hip and circumferentially of the entire left  thigh suggesting superficial cellulitis.  There is also mild stranding of the  deep soft tissues of the fascial planes, with elongated fluid seen between the  tensor fascia ashanti and vastus lateralis muscle. There is no soft tissue gas. There is no knee joint effusion. _______________      Impression IMPRESSION:    No acute fractures or subluxation or erosion to suggest osteomyelitis. There is superficial and deep soft tissue cellulitis of the left hip and thigh. No soft tissue gas seen to suggest gas-forming organisms. Enlarged left iliac chain and groin lymph nodes which may be reactive. MRI Results:  No results found for this or any previous visit. Nuclear Medicine Results:  No results found for this or any previous visit. US Results:  No results found for this or any previous visit. IR Results:  No results found for this or any previous visit. VAS/US Results:  No results found for this or any previous visit. This document in whole or part of it has been produced using voice recognition software. Unrecognized errors in transcription may be present. Sloane Morton MD  Hospitalist  [facility]. medical group. Hospitalist Division.   March 23, 2020  2:01 PM

## 2020-03-23 NOTE — PROGRESS NOTES
Problem: Discharge Planning  Goal: *Discharge to safe environment  Outcome: Resolved/Met    Pt dc'd home. He does not want hh does no think he needs it, does not look like has dsg changes and is mobile with a walker independently. fax'd scripts for cm to buy, doxy and lovenox total cost 73.96 to [facility]. Explained to pt,  nurse here will teach him lovenox injections. He will  along with antibx at [facility]. If he has money he can buy percocet script. We cannot purchase pain meds. Explained transport person can call him when arrives to [facility] hospitals and nurse will bring him down. Explained where [facility] was. Delivered walker to room, signed paperwork. Used blue phone for all communication, pt verbalized understanding. # [id number]. Care Management Interventions  PCP Verified by CM: Yes  Palliative Care Criteria Met (RRAT>21 & CHF Dx)?: No  Mode of Transport at Discharge: Other (see comment)  Transition of Care Consult (CM Consult): Discharge Planning  Discharge Durable Medical Equipment: No  Physical Therapy Consult: No  Occupational Therapy Consult: No  Speech Therapy Consult: No  Current Support Network:  Other  Confirm Follow Up Transport: Other (see comment)  Discharge Location  Discharge Placement: Home

## 2020-03-23 NOTE — DISCHARGE INSTRUCTIONS
Patient Education        Absceso cutáneo: Instrucciones de cuidado  Skin Abscess: Care Instructions  Instrucciones de cuidado    Un absceso de la piel es milvia infección bacteriana que forma milvia bolsa de pus. Un forúnculo es milvia especie de absceso de la piel. Puede que el médico haya hecho milvia incisión en el absceso para que pueda drenar el pus. Dickson vez tenga milvia gasa en el tim para que el absceso se mantenga abierto y siga drenando. Marda Brass necesite antibióticos. Deberá hacer un seguimiento con ramírez médico para asegurarse de que la infección haya desaparecido. El médico lo rodriguez examinado minuciosamente, ramez pueden desarrollarse problemas más tarde. Si nota algún problema o nuevos síntomas, busque tratamiento médico de inmediato. La atención de seguimiento es milvia parte clave de ramírez tratamiento y seguridad. Asegúrese de hacer y acudir a todas las citas, y llame a ramírez médico si está teniendo problemas. También es milvia buena idea saber los resultados de veronica exámenes y mantener milvia lista de los medicamentos que sherice. ¿Cómo puede cuidarse en el hogar? · Aplíquese compresas calientes y secas, milvia almohadilla térmica ajustada a baja temperatura o milvia bolsa de [place] 3 o 4 veces al día para el dolor. Mantenga un paño entre la martin de calor y la piel. · Si ramírez médico le recetó antibióticos, tómelos según las indicaciones. No deje de tomarlos solo porque se sienta mejor. Debe trish todos los antibióticos hasta terminarlos. · [employer] analgésicos (medicamentos para el dolor) exactamente según las indicaciones. ? Si el médico le recetó analgésicos, tómelos según las indicaciones. ? Si no está tomando un analgésico recetado, pregúntele a ramírez médico si puede trish un medicamento de [employer]. · Mantenga la venda limpia y seca. Cambie la venda cuando se moje o se ensucie, o por lo menos milvia vez al día. · Si se taponó el absceso con gasa:  ? Manoj Hampton a las citas de seguimiento para que le quiten o le cambien la gasa.  Si el médico le indicó que se quitara usted la gasa, siga las instrucciones que le dieron acerca de cómo [place]. ? Después de quitar la gasa, empape la giuseppe con agua tibia de 15 a 20 minutos 2 veces al día, hasta que la herida se cierre. ¿Cuándo debe pedir ayuda? Llame a ramírez médico ahora mismo o busque atención médica inmediata si:    · Tiene señales de milvia infección que está empeorando, tales adi:  ? Aumento del dolor, la hinchazón, la temperatura o el enrojecimiento. ? Vetas rojizas que emanan de la piel infectada. ? Pus que supura de la herida. ? Sujey Sprung de cerca los cambios en ramírez harshal y asegúrese de comunicarse con ramírez médico si:    · No mejora adi se esperaba. ¿Dónde puede encontrar más información en inglés? Wili Candelaria a http://donny-edita.info/  eHide Michelle [id number] en la búsqueda para aprender más acerca de Adi Shobha cutáneo: Instrucciones de cuidado. \"  Revisado: 30 octubre, 2019Versión del contenido: 12.4  © 5937-6859 Healthwise, Incorporated. Las instrucciones de cuidado fueron adaptadas bajo licencia por [employer] (which disclaims liability or warranty for this information). Si usted tiene [place] afección médica o sobre estas instrucciones, siempre pregunte a ramírez profesional de harshal. Healthwise, Incorporated niega toda garantía o responsabilidad por ramírez uso de esta información. Patient Education        Celulitis: Instrucciones de cuidado  Cellulitis: Care Instructions  Instrucciones de cuidado    La celulitis es milvia infección cutánea causada por bacterias, con mayor frecuencia estreptococos o estafilococos. Suele producirse tras milvia ruptura en la piel por milvia raspadura, tim, mordedura o punción, o tras un salpullido. La celulitis puede tratarse sin hacer pruebas para detectar ramírez causa.  Nai ramírez médico podría hacer pruebas, si es necesario, para detectar bacterias específicas, adi Staphylococcus aureus resistente a la meticilina (MRSA, por veronica siglas en [facility]). El médico lo rodriguez examinado minuciosamente, ramez pueden presentarse problemas más tarde. Si nota algún problema o nuevos síntomas, busque tratamiento médico de inmediato. La atención de seguimiento es milvia parte clave de ramírez tratamiento y seguridad. Asegúrese de hacer y acudir a todas las citas, y llame a ramírez médico si está teniendo problemas. También es milvia buena idea saber los resultados de veronica exámenes y mantener milvia lista de los medicamentos que sherice. ¿Cómo puede cuidarse en el hogar? · [place] veronica antibióticos de la manera indicada. No deje de tomarlos solo porque se sienta mejor. Debe trish todos los antibióticos hasta terminarlos. · Eleve la giuseppe infectada sobre milvia almohada para reducir el dolor y la hinchazón. Trate de mantener la giuseppe por encima del nivel del corazón tan a menudo adi sea posible. · Si ramírez médico le dijo cómo cuidarse la herida, siga las instrucciones de ramírez médico. Si no le jose instrucciones, siga estos consejos generales:  ? Lávese la herida con agua limpia 2 veces al día. No use peróxido de hidrógeno (agua [place]) ni alcohol, los cuales pueden retrasar la sanación. ? Puede cubrirse la herida con milvia capa delgada de vaselina y [place] venda no adherente. ? Aplíquese más vaselina y reemplace la venda según sea necesario. · Sea suzy con los medicamentos. [address] [place] los analgésicos (medicamentos para el dolor) exactamente adi le fueron indicados. ? Si el [employer] jose un analgésico recetado, tómelo adi se lo indicó. ? Si usted no está tomando un analgésico recetado, pregúntele a ramírez médico si puede trish un medicamento de [employer]. Para prevenir la celulitis en el futuro  · Trate de evitar los richardson, los rasguños u otras lesiones en la piel. La celulitis suele ocurrir con mayor frecuencia donde haya milvia ruptura de la piel. · Si tiene [place], tim, [place] leve o mordedura, lávese la herida con agua limpia lo antes que pueda para ayudar a evitar milvia infección.  No use peróxido de hidrógeno (agua [place]) ni alcohol, los cuales pueden retrasar la sanación. · Si tiene hinchazón en las piernas (edema), el uso de medias de soporte y un buen cuidado de la piel pueden ayudarle a prevenir llagas en la piel y celulitis. · Cuide de veronica pies, sobre todo si tiene diabetes u otras afecciones que aumenten el riesgo de [place]. Use zapatos y calcetines. No camine descalzo. Si tiene pie de atleta u otros problemas cutáneos en los pies, hable con ramírez médico de cómo tratarlos. ¿Cuándo debe pedir ayuda? Llame a ramírez médico ahora mismo o busque atención médica inmediata si:    · Tiene señales de que la infección está empeorando, tales adi:  ? Aumento del dolor, la hinchazón, la temperatura o el enrojecimiento. ? Vetas rojizas que salen de la giuseppe. ? Pus que sale de la giuseppe. ? Kecia Arroyo.     · Tiene un salpullido.    Preste especial atención a los cambios en ramírez harshal y asegúrese de comunicarse con ramírez médico si:    · No mejora adi se esperaba. ¿Dónde puede encontrar más información en inglés? Vaya a http://donny-edita.info/  Stephanie Shan [id number] en la búsqueda para aprender más acerca de \"Celulitis: Instrucciones de cuidado. \"  Revisado: 30 octubre, 2019Versión del contenido: 12.4  © 5132-6746 Healthwise, Incorporated. Las instrucciones de cuidado fueron adaptadas bajo licencia por [employer] (which disclaims liability or warranty for this information). Si usted tiene [place] afección médica o sobre estas instrucciones, siempre pregunte a ramírez profesional de harshal. [facility] niega toda garantía o responsabilidad por ramírez uso de esta información. Patient Education        Prediabetes: Instrucciones de cuidado  Prediabetes: Care Instructions  Instrucciones de cuidado  La prediabetes es milvia señal de advertencia de que usted está en riesgo de llegar a tener diabetes tipo 2.  [place] significa que ramírez nivel de azúcar en la ahsia es más alto de lo que debiera ser. Los alimentos que usted come se convierten en azúcar, la cual ramírez cuerpo [place] para obtener energía. Normalmente, un órgano llamado páncreas produce insulina, la cual permite que el azúcar en la ashia llegue a las células del cuerpo. Nai cuando el cuerpo no puede utilizar la [employer], el azúcar no entra en las células. En cambio, se queda en [place] [employer]. [place] se conoce adi resistencia a la insulina. La acumulación de azúcar en la ashia causa prediabetes. Lo pat es que hacer cambios en ramírez estilo de shahnaz puede ayudarle a hacer que el azúcar en la ashia vuelva a un nivel normal y puede ayudarle a evitar o retrasar la diabetes. La atención de seguimiento es milvia parte clave de ramírez tratamiento y seguridad. Asegúrese de hacer y acudir a todas las citas, y llame a ramírez médico si está teniendo problemas. También es milvia buena idea saber los resultados de veronica exámenes y mantener milvia lista de los medicamentos que sherice. ¿Cómo puede cuidarse en el hogar? · Vigile ramírez peso. Un peso saludable ayuda al organismo a usar la insulina de la [place]. · Limite la cantidad de calorías, dulces y grasas poco saludables que come. Pregunte a ramírez médico si debería consultar a un dietista. Un dietista registrado puede ayudarle a elaborar planes de alimentación que se adapten a ramírez estilo de shahnaz. · Xochitl por lo menos 30 minutos de ejercicio la mayoría de los días de la [place]. El ejercicio ayuda a controlar el azúcar en ramírez ashia. También ayuda a mantener un peso saludable. Caminar es milvia buena opción. Es posible que también quiera hacer otras actividades, adi correr, nadar, [employer], o jugar al tenis u otros deportes de equipo. · No fume. Fumar puede empeorar la prediabetes. Si necesita ayuda para dejar de fumar, hable con ramírez médico sobre programas y medicamentos para dejar de fumar. Estos pueden aumentar veronica probabilidades de dejar el hábito para siempre.   · Si ramírez médico le recetó medicamentos, tómelos exactamente adi se lo indicó. Llame a ramírez médico si eladio estar teniendo problemas con ramírez medicamento. [place] [employer] medicamentos específicos recetados por ramríez médico.  ¿Cuándo debe pedir ayuda? Preste especial atención a los cambios en ramírez harshal y asegúrese de comunicarse con ramírez médico si:    · Tiene cualquier síntoma de diabetes. Estos síntomas podrían incluir:  ? Tener sed con más frecuencia. ? [employer]. ? Causes. ? [employer]. ? Sentirse muy cansado. ? Tener visión borrosa.     · Tiene milvia herida que no cicatriza.     · Tiene milvia infección que no desaparece.     · Tiene problemas con ramírez presión arterial.     · Desea más información sobre la diabetes y cómo evitarla. ¿Dónde puede encontrar más información en inglés? Pratibha Knutsono a http://donny-edita.info/  Escriba [id number] en la búsqueda para aprender más acerca de \"Prediabetes: Instrucciones de cuidado. \"  Revisado: 19 nichole, 2019Versión del contenido: 12.4  © 7651-1374 Healthwise, Incorporated. Las instrucciones de cuidado fueron adaptadas bajo licencia por [employer] (which disclaims liability or warranty for this information). Si usted tiene [place] afección médica o sobre estas instrucciones, siempre pregunte a ramírez profesional de harshal. Healthwise, Incorporated niega toda garantía o responsabilidad por ramírez uso de esta información. Patient Education        Septicemia: Instrucciones de cuidado  Sepsis: Care Instructions  Instrucciones de cuidado    La septicemia es milvia reacción intensa a milvia infección. Puede causar un daño mortal al organismo y provocar presión arterial peligrosamente baja. Usted puede tener inflamación en grandes zonas del cuerpo. La septicemia puede dañar los tejidos e incluso llegar hasta lo más profundo de los [place].   Las infecciones que pueden causar septicemia incluyen:  · Milvia infección en la piel; por Lexie Blade a un tim.  · Milvia infección pulmonar, adi la neumonía. · Milvia infección renal.  · Milvia infección intestinal, adi Escherichia coli (E. coli). Es importante cuidarse y tratar de evitar las infecciones de [place] que no vuelva a tener septicemia. La atención de seguimiento es milvia parte clave de ramírez tratamiento y seguridad. Asegúrese de hacer y acudir a todas las citas, y llame a ramírez médico si está teniendo problemas. También es milvia buena idea saber los resultados de veronica exámenes y mantener milvia lista de los medicamentos que sherice. ¿Cómo puede cuidarse en el hogar? · Si ramírez médico le recetó antibióticos, tómelos según las indicaciones. No deje de tomarlos por el hecho de sentirse mejor. Debe trish todos los antibióticos hasta terminarlos. · Ayude a prevenir las infecciones que pueden causar septicemia:  ? Trate de evitar los resfriados y la gripe. Si tiene que estar cerca de alguien que esté resfriado o tenga la gripe, lávese las rafiq con frecuencia. Y vacúnese contra la gripe todos los [place]. ? Póngase la vacuna antineumocócica (para prevenir la neumonía, la meningitis y otras infecciones). Si ya le pusieron [employer] pasado, pregúntele a ramírez médico si necesita otra dosis. ? Límpiese cualquier herida o rasguño. · No fume ni use otros productos derivados del tabaco. Cuando mauricio de fumar, usted reduce veronica probabilidades de tener resfriados, gripe, bronquitis y neumonía. Si necesita ayuda para dejar de fumar, hable con ramírez médico acerca de programas y medicamentos para dejar de fumar. Estos pueden aumentar las probabilidades de dejar el hábito para siempre. · Sherol Shana líquido para prevenir la deshidratación. Opte por beber agua y otros líquidos rocío sin cafeína hasta que se sienta mejor. Si tiene enfermedad renal, cardíaca o hepática y tiene que limitar el consumo de líquidos, hable con ramírez médico antes de aumentar la cantidad de líquido que flash.   · Siga milvia dieta saludable que incluya frutas, verduras y granos integrales todos los días. · Si ramírez médico lo recomienda, trate de hacer algo de [place]. Caminar es miliva buena opción. Poco a poco, aumente la distancia que camina cada día. ¿Cuándo debe pedir ayuda? Llame al 911 en cualquier momento que considere que necesita atención de [place]. Por ejemplo, llame si:    · Se desmayó (perdió el conocimiento).    Llame a ramírez médico ahora mismo o busque atención médica inmediata si:    · Tiene síntomas de septicemia. Estos pueden incluir:  ? Falta de aire. ? Latido cardíaco acelerado. ? Larri Proper, pálida o sudorosa. ? Sensación de confusión.     · Está mareado o aturdido, o siente que se va a desmayar.     · Tiene fiebre o escalofríos.    Preste especial atención a los cambios en ramírez harshal y asegúrese de comunicarse con ramírez médico si:    · No mejora adi se esperaba. ¿Dónde puede encontrar más información en inglés? Vaya a http://donny-edita.info/  Lore Shin [id number] en la búsqueda para aprender más acerca de \"Septicemia: Instrucciones de cuidado. \"  Revisado: 26 junio, 2019Versión del contenido: 12.4  © 1262-7954 Healthwise, Incorporated. Las instrucciones de cuidado fueron adaptadas bajo licencia por [employer] (which disclaims liability or warranty for this information). Si usted tiene [place] afección médica o sobre estas instrucciones, siempre pregunte a ramírez profesional de harshal. Healthwise, Incorporated niega toda garantía o responsabilidad por ramírez uso de esta información.

## 2020-03-23 NOTE — PROGRESS NOTES
If pt needs iv antibx need daily dosing for op infusion. He is uninsured and that is only way he can receive iv antibxs. Possibly will benefit from ID consult? Spoke with mayela, pt not dc'd, she will call ID consult if needs iv antibx will need picc.

## 2020-03-23 NOTE — PROGRESS NOTES
Problem: Falls - Risk of  Goal: *Absence of Falls  Description: Document Tatyana Meyer Fall Risk and appropriate interventions in the flowsheet. Outcome: Progressing Towards Goal  Note: Fall Risk Interventions:  Mobility Interventions: Patient to call before getting OOB, Utilize walker, cane, or other assistive device, Utilize gait belt for transfers/ambulation         Medication Interventions: Evaluate medications/consider consulting pharmacy, Patient to call before getting OOB, Teach patient to arise slowly    Elimination Interventions: Toilet paper/wipes in reach, Urinal in reach              Problem: Patient Education: Go to Patient Education Activity  Goal: Patient/Family Education  Outcome: Progressing Towards Goal     Problem: Pressure Injury - Risk of  Goal: *Prevention of pressure injury  Description: Document Rich Scale and appropriate interventions in the flowsheet. Outcome: Progressing Towards Goal  Note: Pressure Injury Interventions:             Activity Interventions: Increase time out of bed, Pressure redistribution bed/mattress(bed type), PT/OT evaluation    Mobility Interventions: HOB 30 degrees or less, Pressure redistribution bed/mattress (bed type), PT/OT evaluation    Nutrition Interventions: Document food/fluid/supplement intake                     Problem: Patient Education: Go to Patient Education Activity  Goal: Patient/Family Education  Outcome: Progressing Towards Goal     Problem: Pain  Goal: *Control of Pain  Outcome: Progressing Towards Goal     Problem: Cellulitis Care Plan (Adult)  Goal: *Control of acute pain  Outcome: Progressing Towards Goal  Goal: *Skin integrity maintained  Outcome: Progressing Towards Goal  Goal: *Absence of infection signs and symptoms  Outcome: Progressing Towards Goal     Problem: Altered nutrition  Goal: *Optimize nutritional status  Outcome: Progressing Towards Goal     Problem: Patient Education: Go to Patient Education Activity  Goal: Patient/Family Education  Outcome: Progressing Towards Goal     Problem: Patient Education: Go to Patient Education Activity  Goal: Patient/Family Education  Outcome: Progressing Towards Goal     Problem: Surgical Pathway Post-Op Day 2 through Discharge  Goal: Off Pathway (Use only if patient is Off Pathway)  Outcome: Progressing Towards Goal  Goal: Activity/Safety  Outcome: Progressing Towards Goal  Goal: Nutrition/Diet  Outcome: Progressing Towards Goal  Goal: Discharge Planning  Outcome: Progressing Towards Goal  Goal: Medications  Outcome: Progressing Towards Goal  Goal: Respiratory  Outcome: Progressing Towards Goal  Goal: Treatments/Interventions/Procedures  Outcome: Progressing Towards Goal  Goal: Psychosocial  Outcome: Progressing Towards Goal  Goal: *No signs and symptoms of infection or wound complications  Outcome: Progressing Towards Goal  Goal: *Optimal pain control at patient's stated goal  Outcome: Progressing Towards Goal  Goal: *Adequate urinary output (equal to or greater than 30 milliliters/hour)  Outcome: Progressing Towards Goal  Goal: *Hemodynamically stable  Outcome: Progressing Towards Goal  Goal: *Tolerating diet  Outcome: Progressing Towards Goal  Goal: *Demonstrates progressive activity  Outcome: Progressing Towards Goal  Goal: *Lungs clear or at baseline  Outcome: Progressing Towards Goal     Problem: Surgical Pathway: Discharge Outcomes  Goal: *Hemodynamically stable  Outcome: Progressing Towards Goal  Goal: *Lungs clear or at baseline  Outcome: Progressing Towards Goal  Goal: *Demonstrates independent activity or return to baseline  Outcome: Progressing Towards Goal  Goal: *Optimal pain control at patient's stated goal  Outcome: Progressing Towards Goal  Goal: *Verbalizes understanding and describes prescribed diet  Outcome: Progressing Towards Goal  Goal: *Tolerating diet  Outcome: Progressing Towards Goal  Goal: *Verbalizes name, dosage, time, side effects, and number of days to continue medications  Outcome: Progressing Towards Goal  Goal: *No signs and symptoms of infection or wound complications  Outcome: Progressing Towards Goal  Goal: *Anxiety reduced or absent  Outcome: Progressing Towards Goal  Goal: *Understands and describes signs and symptoms to report to providers(Stroke Metric)  Outcome: Progressing Towards Goal  Goal: *Describes follow-up/return visits to physicians  Outcome: Progressing Towards Goal  Goal: *Describes available resources and support systems  Outcome: Progressing Towards Goal     Problem: Risk for Spread of Infection  Goal: Prevent transmission of infectious organism to others  Description: Prevent the transmission of infectious organisms to other patients, staff members, and visitors.   Outcome: Progressing Towards Goal     Problem: Patient Education:  Go to Education Activity  Goal: Patient/Family Education  Outcome: Progressing Towards Goal

## 2020-03-23 NOTE — PROGRESS NOTES
Progress Note    Assessment:    1. Status post irrigation and debridement x2 now with primary closure      PLAN:    1. Mobilize. Continue P.T.  2. 50% PWB  3. Lovenox for DVT prophylaxis  4. Discharge Planning when abx arranged  5. ID consulted for MRSA. Appreciate assistance           HPI: Maria Alejandra He is a 26 y.o. male patient without new complaints status post I&D for Cellulitis of left thigh [L03.116]  Sepsis ([address] [A41.9]  Thigh abscess [L02.419] 3/17/2020. Blood pressure 123/73, pulse 74, temperature 98.5 °F (36.9 °C), resp. rate 18, height 5' 7\" (1.702 m), weight 79 kg (174 lb 1.6 oz), SpO2 98 %. CBC w/Diff   Lab Results   Component Value Date/Time    WBC 20.1 (H) 03/19/2020 03:30 PM    RBC 4.29 (L) 03/19/2020 03:30 PM    HCT 38.8 03/19/2020 03:30 PM          Physical Assessment:  General: in no apparent distress   Extremities:  Neurovascular intact. Swelling decreased   Dressing:  Minimal drainage   DVT Exam:   No exam evidence to suggest DVT. Compartments soft and NT.            Component Value Flag Ref Range Units Status   Special Requests:      Final   NO SPECIAL REQUESTS    GRAM STAIN FEW WBCS SEEN      Final   GRAM STAIN NO ORGANISMS SEEN      Final   Culture result: Abnormal       Final   MODERATE * METHICILLIN RESISTANT STAPHYLOCOCCUS AUREUS *               - [id number], [place]  3/23/2020  Office [phone]  Cell [phone]

## 2020-03-23 NOTE — PROGRESS NOTES
Problem: Falls - Risk of  Goal: *Absence of Falls  Description: Document Berna Borja Fall Risk and appropriate interventions in the flowsheet. Outcome: Progressing Towards Goal  Note: Fall Risk Interventions:  Mobility Interventions: Patient to call before getting OOB         Medication Interventions: Evaluate medications/consider consulting pharmacy, Patient to call before getting OOB    Elimination Interventions: Bed/chair exit alarm, Stay With Me (per policy), Toilet paper/wipes in reach, Toileting schedule/hourly rounds              Problem: Pressure Injury - Risk of  Goal: *Prevention of pressure injury  Description: Document Rich Scale and appropriate interventions in the flowsheet. Outcome: Progressing Towards Goal  Note: Pressure Injury Interventions:             Activity Interventions: Increase time out of bed, Pressure redistribution bed/mattress(bed type)    Mobility Interventions: HOB 30 degrees or less, Pressure redistribution bed/mattress (bed type)    Nutrition Interventions: Document food/fluid/supplement intake                     Problem: Patient Education: Go to Patient Education Activity  Goal: Patient/Family Education  Outcome: Progressing Towards Goal     Problem: Pain  Goal: *Control of Pain  Outcome: Progressing Towards Goal     Problem: Cellulitis Care Plan (Adult)  Goal: *Control of acute pain  Outcome: Progressing Towards Goal  Goal: *Skin integrity maintained  Outcome: Progressing Towards Goal  Goal: *Absence of infection signs and symptoms  Outcome: Progressing Towards Goal     Problem: Patient Education: Go to Patient Education Activity  Goal: Patient/Family Education  Outcome: Progressing Towards Goal     Problem: Patient Education: Go to Patient Education Activity  Goal: Patient/Family Education  Outcome: Progressing Towards Goal     Problem: Surgical Pathway Post-Op Day 2 through Discharge  Goal: Off Pathway (Use only if patient is Off Pathway)  Outcome: Progressing Towards Goal  Goal: Activity/Safety  Outcome: Progressing Towards Goal  Goal: Nutrition/Diet  Outcome: Progressing Towards Goal  Goal: Discharge Planning  Outcome: Progressing Towards Goal  Goal: Medications  Outcome: Progressing Towards Goal  Goal: Respiratory  Outcome: Progressing Towards Goal  Goal: Treatments/Interventions/Procedures  Outcome: Progressing Towards Goal  Goal: Psychosocial  Outcome: Progressing Towards Goal  Goal: *No signs and symptoms of infection or wound complications  Outcome: Progressing Towards Goal  Goal: *Optimal pain control at patient's stated goal  Outcome: Progressing Towards Goal  Goal: *Adequate urinary output (equal to or greater than 30 milliliters/hour)  Outcome: Progressing Towards Goal  Goal: *Hemodynamically stable  Outcome: Progressing Towards Goal  Goal: *Tolerating diet  Outcome: Progressing Towards Goal  Goal: *Demonstrates progressive activity  Outcome: Progressing Towards Goal  Goal: *Lungs clear or at baseline  Outcome: Progressing Towards Goal     Problem: Surgical Pathway: Discharge Outcomes  Goal: *Hemodynamically stable  Outcome: Progressing Towards Goal  Goal: *Lungs clear or at baseline  Outcome: Progressing Towards Goal  Goal: *Demonstrates independent activity or return to baseline  Outcome: Progressing Towards Goal  Goal: *Optimal pain control at patient's stated goal  Outcome: Progressing Towards Goal  Goal: *Verbalizes understanding and describes prescribed diet  Outcome: Progressing Towards Goal  Goal: *Tolerating diet  Outcome: Progressing Towards Goal  Goal: *Verbalizes name, dosage, time, side effects, and number of days to continue medications  Outcome: Progressing Towards Goal  Goal: *No signs and symptoms of infection or wound complications  Outcome: Progressing Towards Goal  Goal: *Anxiety reduced or absent  Outcome: Progressing Towards Goal  Goal: *Understands and describes signs and symptoms to report to providers(Stroke Metric)  Outcome: Progressing Towards Goal  Goal: *Describes follow-up/return visits to physicians  Outcome: Progressing Towards Goal  Goal: *Describes available resources and support systems  Outcome: Progressing Towards Goal     Problem: Risk for Spread of Infection  Goal: Prevent transmission of infectious organism to others  Description: Prevent the transmission of infectious organisms to other patients, staff members, and visitors.   Outcome: Progressing Towards Goal     Problem: Patient Education:  Go to Education Activity  Goal: Patient/Family Education  Outcome: Progressing Towards Goal     Problem: Altered nutrition  Goal: *Optimize nutritional status  Outcome: Progressing Towards Goal

## 2020-03-23 NOTE — PROGRESS NOTES
3317 Bedside and Verbal shift change report given to Diego Guerrero RN (oncoming nurse) by Ovi Roper RN (offgoing nurse). Report included the following information SBAR, Kardex, Intake/Output and MAR.  
 
9956 patient resting on bed quietly without signs of distress, nausea and vomiting, left leg elevated as ordered, patient denies pain, bed in low position, call bell in reach, continue to monitor  
 
0900 due meds given, patient tolerated well 
 
1030 patient resting comfortably on the chair, no new complaints at this time 1130 patient complains of pain 5/10, po med percocet given 1230 patient states pain declined to 2/10, resting well on chair, continue to monitor. Jesus Alejandre 1630 patient discharge instructions explained with Eugenio Padilla RN, demonstrated Lovenox injection and how to rotate sites, patient verbalized understanding 
 
1700 patient wheeled out for discharge to family via wheelchair

## 2020-03-23 NOTE — PROGRESS NOTES
Internal Medicine Progress Note        NAME: Maria Alejandra He   :  1995  MRM:  [id number]    Date/Time: 3/23/2020        ASSESSMENT/PLAN:       #  Abscess of left thigh (3/19/2020). Abscess grew MRSA , iv vanco per pharmacy protocol  S/P surgery. Surgical management including wound care, pain control and DVT ppx per surgery. wound vac removed  Mobility per Ortho recommendations: WBAT. Continue PT. Mobilize. ID consulted. # Sepsis . due to thigh infection/abscess. Clinically stable. Hemodynamically stable. # Cellulitis of left thigh (3/17/2020). Abx as above     #  Prediabetes (3/18/2020)    Overview: HbA1c 6.0% on 3/17/2020. Lab Review:     No results for input(s): WBC, HGB, HCT, PLT, HGBEXT, HCTEXT, PLTEXT, HGBEXT, HCTEXT, PLTEXT in the last 72 hours. Recent Labs     20  0425 20  0435 20  0310    142 141   K 3.8 3.9 3.5    106 105   CO2 29 29 27   GLU 90 94 86   BUN 9 9 10   CREA 0.60 0.63 0.64   CA 8.5 8.0* 7.7*   MG  --  2.2  --    PHOS  --  4.3  --      Lab Results   Component Value Date/Time    Glucose (POC) 90 2020 11:25 AM    Glucose (POC) 140 (H) 2020 08:29 AM    Glucose (POC) 104 2020 09:28 PM    Glucose (POC) 119 (H) 2020 03:27 PM    Glucose (POC) 116 (H) 2020 11:49 AM     No results for input(s): PH, PCO2, PO2, HCO3, FIO2 in the last 72 hours. No results for input(s): INR, INREXT, INREXT in the last 72 hours. No results found for: SDES  Lab Results   Component Value Date/Time    Culture result: NO GROWTH 2 DAYS 2020 08:00 AM    Culture result: (A) 2020 05:21 PM     MODERATE * METHICILLIN RESISTANT STAPHYLOCOCCUS AUREUS *    Culture result: NO GROWTH 6 DAYS 2020 01:30 PM    Culture result: NO GROWTH 6 DAYS 2020 01:30 PM        Intervals noted      Subjective:     Chief Complaint:      Feel good offer no complain  Eating good.        ROS:  (bold if positive,otherwise negative)    Fever/chills ,  Dysuria   Cough , Sputum , SOB/LAKHANI , Chest Pain     Diarrhea ,Nausea/Vomit , Abd Pain , Constipation     Tolerating Diet                Objective:     Vitals:  Last 24hrs VS reviewed since prior progress note. Most recent are:    Visit Vitals  /73 (BP 1 Location: Left arm, BP Patient Position: At rest)   Pulse 74   Temp 98.5 °F (36.9 °C)   Resp 18   Ht 5' 7\" (1.702 m)   Wt 79 kg (174 lb 1.6 oz)   SpO2 98%   BMI 27.27 kg/m²     SpO2 Readings from Last 6 Encounters:   03/23/20 98%    O2 Flow Rate (L/min): 10 l/min       Intake/Output Summary (Last 24 hours) at 3/23/2020 1241  Last data filed at 3/23/2020 0359  Gross per 24 hour   Intake 535 ml   Output 1950 ml   Net -1415 ml          Physical Exam:   Gen:  No distress, no complaint  Lungs:  Clear bilaterally, no wheeze or rhonchi  Heart:  Regular rate and rhythm, no murmurs or gallops  Abdomen:  Soft, non-tender, normal bowel sounds  Left thigh tenderness and  swelling improved. Cellulitis improved. Drain out.     Medications Reviewed: (see below)    Lab Data Reviewed: (see below)    ______________________________________________________________________    Medications:     Current Facility-Administered Medications   Medication Dose Route Frequency    VANCOMYCIN INFORMATION NOTE   Other ONCE    enoxaparin (LOVENOX) injection 40 mg  40 mg SubCUTAneous Q24H    vancomycin (VANCOCIN) 1500 mg in  ml infusion  1,500 mg IntraVENous Q8H    0.9% sodium chloride infusion 250 mL  250 mL IntraVENous PRN    0.9% sodium chloride infusion  50 mL/hr IntraVENous CONTINUOUS    sodium chloride (NS) flush 5-40 mL  5-40 mL IntraVENous Q8H    sodium chloride (NS) flush 5-40 mL  5-40 mL IntraVENous PRN    oxyCODONE-acetaminophen (PERCOCET) 5-325 mg per tablet 1 Tab  1 Tab Oral Q4H PRN    oxyCODONE-acetaminophen (PERCOCET 10)  mg per tablet 2 Tab  2 Tab Oral Q4H PRN    morphine injection 2 mg  2 mg IntraVENous Q4H PRN    insulin lispro (HUMALOG) injection   SubCUTAneous AC&HS    sodium chloride (NS) flush 5-10 mL  5-10 mL IntraVENous PRN    ondansetron (ZOFRAN) injection 4 mg  4 mg IntraVENous Q4H PRN    glucose chewable tablet 16 g  4 Tab Oral PRN    glucagon (GLUCAGEN) injection 1 mg  1 mg IntraMUSCular PRN    dextrose 10% infusion 125-250 mL  125-250 mL IntraVENous PRN    albuterol-ipratropium (DUO-NEB) 2.5 MG-0.5 MG/3 ML  3 mL Nebulization Q6H PRN    docusate sodium (COLACE) capsule 100 mg  100 mg Oral BID PRN    melatonin tablet 12 mg  12 mg Oral QHS PRN    piperacillin-tazobactam (ZOSYN) 3.375 g in 0.9% sodium chloride (MBP/ADV) 100 mL MBP - Extended dosing interval  3.375 g IntraVENous Q8H    VANCOMYCIN INFORMATION NOTE 1 Each  1 Each Other Rx Dosing/Monitoring    naloxone (NARCAN) injection 0.4 mg  0.4 mg IntraVENous PRN          Total time spent with patient: 25 minutes                  Care Plan discussed with: Patient, Nursing Staff and >50% of time spent in counseling and coordination of care    Discussed:  Care Plan       Disposition:  Home w/Family           This document in whole or part of it has been produced using voice recognition software. Unrecognized errors in transcription may be present.     Attending Physician: Danise Eisenmenger, MD

## 2020-03-23 NOTE — PROGRESS NOTES
Problem: Self Care Deficits Care Plan (Adult)  Goal: *Acute Goals and Plan of Care (Insert Text)  Description: Occupational Therapy Goals  Initiated 3/19/2020 within 7 day(s). 1.  Patient will perform grooming with modified independence. 2.  Patient will perform bathing with modified independence. 3.  Patient will perform lower body dressing with modified independence. 4.  Patient will perform toilet transfers with modified independence while adhering to LLE PWB. 5.  Patient will perform all aspects of toileting with modified independence. 6.  Patient will participate in upper extremity therapeutic exercise/activities with modified independence for 15 minutes. 7.  Patient will utilize energy conservation techniques during functional activities with verbal cues. Prior Level of Function: (I) w/ ADLs and functional mobility w/o AD    Outcome: Progressing Towards Goal  OCCUPATIONAL THERAPY TREATMENT    Patient: Jane Villasenor (78 y.o. male)  Date: 3/23/2020  Diagnosis: Cellulitis of left thigh [L03.116]  Sepsis ([address] [A41.9]  Thigh abscess [L02.419]   Sepsis ([address]  Procedure(s) (LRB):  EXTREMITY IRRIGATION AND DEBRIDEMENT OF LEFT THIGH ABSCESS (Left) 3 Days Post-Op  Precautions: Fall  PLOF: see above    Chart, occupational therapy assessment, plan of care, and goals were reviewed. ASSESSMENT:  Nursing/Tamiko cleared for pt to participate in OT tx session. Patient sitting up in recliner with BLEs elevated, no c/o pain. Patient performed bedside commode transfer: Mod I sit to stand and stand step with RW from recliner <-> bedside commode transfer with Supv. Call bell within reach & pt verbalized understanding and provided return demonstration to utilize for assist e.g. functional transfers in order to prevent falls.      Progression toward goals:  []          Improving appropriately and progressing toward goals  [x]          Improving slowly and progressing toward goals  []          Not making progress toward goals and plan of care will be adjusted     PLAN:  Patient continues to benefit from skilled intervention to address the above impairments. Continue treatment per established plan of care. Discharge Recommendations:  Home Health  Further Equipment Recommendations for Discharge:  bedside commode and rolling walker     SUBJECTIVE:   Patient stated I'm doing OK.     OBJECTIVE DATA SUMMARY:   Cognitive/Behavioral Status:  Neurologic State: Alert  Orientation Level: Oriented to person  Cognition: Follows commands  Safety/Judgement: Fall prevention    Functional Mobility and Transfers for ADLs:   Bed Mobility:     Supine to Sit: Modified independent     Transfers:  Sit to Stand: Modified independent  Stand to Sit: Modified independent     Toilet Transfer : Supervision     Balance:  Sitting: Intact  Sitting - Static: Good (unsupported)  Sitting - Dynamic: Good (unsupported)  Standing: Intact  Standing - Static: Good  Standing - Dynamic : Good    Pain:  Pain level pre-treatment: 0/10   Pain level post-treatment: 0/10  Pain Intervention(s): Medication (see MAR); Rest, Ice, Repositioning   Response to intervention: Nurse notified, See doc flow    Activity Tolerance:    fair  Please refer to the flowsheet for vital signs taken during this treatment. After treatment:   [x]  Patient left in no apparent distress sitting up in chair  []  Patient left in no apparent distress in bed  [x]  Call bell left within reach  [x]  Nursing notified  []  Caregiver present  []  Bed alarm activated    COMMUNICATION/EDUCATION:   [x] Role of Occupational Therapy in the acute care setting  [x] Home safety education was provided and the patient/caregiver indicated understanding. [x] Patient/family have participated as able in working towards goals and plan of care. [x] Patient/family agree to work toward stated goals and plan of care.   [] Patient understands intent and goals of therapy, but is neutral about his/her participation. [] Patient is unable to participate in goal setting and plan of care.       Thank you for this referral.  Pavan Pulido  Time Calculation: 10 mins

## 2020-03-23 NOTE — CONSULTS
[facility]                                            (A Division of [address] [place] [address])                                   Consultation Note      Date of Admission: 3/17/2020    Date of Consultation: 3/23/2020    Referred by: Dr. Raffy Garcia, Dr. Stanislaw Wynn    Reason for Referral: antibiotic recommendations s/p Left thigh abscess I&D, MRSA    Current Antimicrobials: Prior Antimicrobials   Zosyn, Vanc 3/17 - 6 post op 3/18 - 5        Assessment: Plan:   Left thigh abscess  - s/p I&D, wd vac 3/18  - cx MRSA sensitive to tetracycline, doxycycline, TMP-SMX resistant to Clindamycin and Ciprofloxacin  - has received 5 days abx post I&D which should suffice but could add 2 more days po -> dc Vanc, Zosyn  -> Doxycycline 100 mg bid x 2 days  -> local wound care per ortho   Sepsis  - fever, tachycardia present on admission  - resolved      Microbiology:    3/17 blcx NG x 2  3/18 L thigh tissue cx MRSA     Lines / Catheters:    piv    HPI:    22year-old  male with no significant past medical history admitted to [facility] 3/19 with a painful swollen left thigh. There was no history of trauma to the left thigh but noticed pain and swelling in the area around 5 days PTA which progressed to the point of involving his entire thigh from the groin distally to the knee. Presenting to the ED on 3/17 with temperature 101.3,  and WBC of 12.8. CT 3/17 showed superficial and deep soft tissue cellulitis of the left hip and thigh with no soft tissue gas with enlarged left iliac chain and groin lymph nodes. He was admitted 3/17 and started on Vancomycin, zosyn and Clindamycin. Taken to the OR 3/19 by Dr. Sravanthi Melton, a left thigh abscess was found and drained with debridement of fascia, subcutaneous tissue and skin and wound vac placement. Vancomycin and zosyn were continued and fever resolved with last fever recorded being 100.2 3/20.   WBC count jumped to 20.1 on 3/19 and has not been checked since. Left thigh tissue culture from 3/18 grew MRSA sensitive to tetracycline, doxycycline, TMP-SMX resistant to Clindamycin and Ciprofloxacin. Active Hospital Problems    Diagnosis Date Noted    Abscess of left thigh 03/19/2020    Thigh abscess 03/19/2020    Prediabetes 03/18/2020     HbA1c 6.0% on 3/17/2020.  Sepsis ([facility] [address] 03/17/2020    Cellulitis of left thigh 03/17/2020     History reviewed. No pertinent past medical history. History reviewed. No pertinent surgical history. Family History   Problem Relation Age of Onset    No Known Problems Mother     No Known Problems Father      Social History     Socioeconomic History    Marital status: SINGLE     Spouse name: Not on file    Number of children: Not on file    Years of education: Not on file    Highest education level: Not on file   Occupational History    Not on file   Social Needs    Financial resource strain: Not on file    Food insecurity     Worry: Not on file     Inability: Not on file    Transportation needs     Medical: Not on file     Non-medical: Not on file   Tobacco Use    Smoking status: Never Smoker    Smokeless tobacco: Never Used   Substance and Sexual Activity    Alcohol use:  Yes    Drug use: Not Currently    Sexual activity: Not on file   Lifestyle    Physical activity     Days per week: Not on file     Minutes per session: Not on file    Stress: Not on file   Relationships    Social connections     Talks on phone: Not on file     Gets together: Not on file     Attends Hoahaoism service: Not on file     Active member of club or organization: Not on file     Attends meetings of clubs or organizations: Not on file     Relationship status: Not on file    Intimate partner violence     Fear of current or ex partner: Not on file     Emotionally abused: Not on file     Physically abused: Not on file     Forced sexual activity: Not on file   Other Topics Concern   [address] Service Not Asked    Blood Transfusions Not Asked    Caffeine Concern Not Asked    Occupational Exposure Not Asked    Hobby Hazards Not Asked    Sleep Concern Not Asked    Stress Concern Not Asked    Weight Concern Not Asked    Special Diet Not Asked    Back Care Not Asked    Exercise Not Asked    Bike Helmet Not Asked   [address] Not Asked    Self-Exams Not Asked   Social History Narrative    Not on file       Allergies:  Patient has no known allergies.  .     Medications:  Current Facility-Administered Medications   Medication Dose Route Frequency    VANCOMYCIN INFORMATION NOTE   Other ONCE    enoxaparin (LOVENOX) injection 40 mg  40 mg SubCUTAneous Q24H    vancomycin (VANCOCIN) 1500 mg in  ml infusion  1,500 mg IntraVENous Q8H    0.9% sodium chloride infusion 250 mL  250 mL IntraVENous PRN    0.9% sodium chloride infusion  50 mL/hr IntraVENous CONTINUOUS    sodium chloride (NS) flush 5-40 mL  5-40 mL IntraVENous Q8H    sodium chloride (NS) flush 5-40 mL  5-40 mL IntraVENous PRN    oxyCODONE-acetaminophen (PERCOCET) 5-325 mg per tablet 1 Tab  1 Tab Oral Q4H PRN    oxyCODONE-acetaminophen (PERCOCET 10)  mg per tablet 2 Tab  2 Tab Oral Q4H PRN    morphine injection 2 mg  2 mg IntraVENous Q4H PRN    insulin lispro (HUMALOG) injection   SubCUTAneous AC&HS    sodium chloride (NS) flush 5-10 mL  5-10 mL IntraVENous PRN    ondansetron (ZOFRAN) injection 4 mg  4 mg IntraVENous Q4H PRN    glucose chewable tablet 16 g  4 Tab Oral PRN    glucagon (GLUCAGEN) injection 1 mg  1 mg IntraMUSCular PRN    dextrose 10% infusion 125-250 mL  125-250 mL IntraVENous PRN    albuterol-ipratropium (DUO-NEB) 2.5 MG-0.5 MG/3 ML  3 mL Nebulization Q6H PRN    docusate sodium (COLACE) capsule 100 mg  100 mg Oral BID PRN    melatonin tablet 12 mg  12 mg Oral QHS PRN    piperacillin-tazobactam (ZOSYN) 3.375 g in 0.9% sodium chloride (MBP/ADV) 100 mL MBP - Extended dosing interval  3.375 g IntraVENous Q8H    VANCOMYCIN INFORMATION NOTE 1 Each  1 Each Other Rx Dosing/Monitoring    naloxone (NARCAN) injection 0.4 mg  0.4 mg IntraVENous PRN        ROS:  A comprehensive review of systems was negative except for that written in the History of Present Illness. Physical Exam:  Temp (24hrs), Av.2 °F (36.8 °C), Min:97.8 °F (36.6 °C), Max:98.5 °F (36.9 °C)    Visit Vitals  /73 (BP 1 Location: Left arm, BP Patient Position: At rest)   Pulse 74   Temp 98.5 °F (36.9 °C)   Resp 18   Ht 5' 7\" (1.702 m)   Wt 79 kg (174 lb 1.6 oz)   SpO2 98%   BMI 27.27 kg/m²       General: Well developed, well nourished 26 y.o.  male in no acute distress. ENT: ENT exam normal, no neck nodes or sinus tenderness  Head: normocephalic, without obvious abnormality  Mouth:  mucous membranes moist, pharynx normal without lesions  Neck: supple, symmetrical, trachea midline   Cardio:  regular rate and rhythm, S1, S2 normal, no murmur, click, rub or gallop  Chest: inspection normal - no chest wall deformities or tenderness, respiratory effort normal  Lungs: clear to auscultation, no wheezes or rales and unlabored breathing  Abdomen: soft, non-tender. Bowel sounds normal. No masses, no organomegaly. Extremities:  Left thigh dressing in place  Neuro: Grossly normal     Lab results:    Chemistry  Recent Labs     20  0425 20  0435 20  0310   GLU 90 94 86    142 141   K 3.8 3.9 3.5    106 105   CO2 29 29 27   BUN 9 9 10   CREA 0.60 0.63 0.64   CA 8.5 8.0* 7.7*   AGAP 6 7 9   BUCR 15 14 16       CBC w/ Diff  No results for input(s): WBC, RBC, HGB, HCT, PLT, GRANS, LYMPH, EOS, HGBEXT, HCTEXT, PLTEXT in the last 72 hours.     Microbiology  All Micro Results     Procedure Component Value Units Date/Time    CULTURE, BLOOD [[id number]] Collected:  20 1330    Order Status:  Completed Specimen:  Blood Updated:  20 0732     Special Requests: --        NO SPECIAL REQUESTS  RIGHT  Antecubital       Culture result: NO GROWTH 6 DAYS       CULTURE, BLOOD [[id number]] Collected:  03/17/20 1330    Order Status:  Completed Specimen:  Blood Updated:  03/23/20 0732     Special Requests: --        NO SPECIAL REQUESTS  LEFT  Antecubital       Culture result: NO GROWTH 6 DAYS       ANAEROBE ID REFLEX [[id number]]  (Abnormal) Collected:  03/18/20 1721    Order Status:  Completed Specimen:  MICROBIAL ISOLATE Updated:  03/22/20 1935     Source PENDING     Result 1 Comment        Comment: (NOTE)  No anaerobic growth in 72 hours. Result 2 Comment        Comment: (NOTE)  Aerobic bacteria have been isolated. For further identification and  susceptibility testing, please contact the lab within one week.   Performed At: [address] [id number]  Wisam Ding MD [place]:[id number]         Cassy Oyster STAIN [[id number]] Collected:  03/20/20 0800    Order Status:  Completed Specimen:  Thigh Updated:  03/22/20 1023     Special Requests: LEFT GLUTEAL WOUND     GRAM STAIN RARE WBCS SEEN         NO ORGANISMS SEEN        Culture result: NO GROWTH 2 DAYS       CULTURE, TISSUE Ysabel Rude STAIN [[id number]]  (Abnormal)  (Susceptibility) Collected:  03/18/20 1721    Order Status:  Completed Specimen:  Thigh Updated:  03/21/20 0843     Special Requests: NO SPECIAL REQUESTS        GRAM STAIN FEW WBCS SEEN         NO ORGANISMS SEEN        Culture result:       MODERATE * METHICILLIN RESISTANT STAPHYLOCOCCUS AUREUS *          CULTURE, TISSUE W Vivianne Dakin [[id number]] Collected:  03/20/20 0800    Order Status:  Canceled Specimen:  Thigh     CULTURE, Doyce Splinter STAIN [[id number]] Collected:  03/17/20 1945    Order Status:  Canceled Specimen:  Wound from Thigh              Diana Virgen MD, [facility]  Infectious Diseases  [phone]  3/23/2020   1:12 PM

## 2020-03-23 NOTE — PROGRESS NOTES
Problem: Mobility Impaired (Adult and Pediatric)  Goal: *Acute Goals and Plan of Care (Insert Text)  Description: Physical Therapy Goals  Initiated 3/19/2020 and to be accomplished within 7 day(s)  1. Patient will move from supine to sit and sit to supine in bed with modified independence. 2.  Patient will transfer from bed to chair and chair to bed with modified independence using the least restrictive device. 3.  Patient will perform sit to stand with modified independence. 4.  Patient will ambulate with modified independence for 50 feet with the least restrictive device. 5.  Patient will ascend/descend 6 stairs with handrail(s) with modified independence. Outcome: Resolved/Met   PHYSICAL THERAPY TREATMENT AND DISCHARGE    Patient: Yinka Howell (47 y.o. male)  Date: 3/23/2020  Diagnosis: Cellulitis of left thigh [L03.116]  Sepsis ([address] [A41.9]  Thigh abscess [L02.419]   Sepsis ([address]  Procedure(s) (LRB):  EXTREMITY IRRIGATION AND DEBRIDEMENT OF LEFT THIGH ABSCESS (Left) 3 Days Post-Op  Precautions: Fall  WBAT LLE  PLOF: Independent  ASSESSMENT:  Per most recent ortho note, WBAT LLE. Knee immobilizer remains in place. Wound vac and all drains removed. Mod I for supine to sit. Mod I for sit to stand. Amb 25ft with ww; mod I. Declines offer of crutches; prefers ww. Mod I for stand to sit. Mod I for sit to stand from recliner. Completed step ups to 8 inch block to simulate stairs. Completed 3 with handrail use and 3 without; no assistance needed with either performance. Denies further concerns with mobility. Seated in recliner with BLE elevated at end of session. Call bell in reach. PLAN:  Further skilled physical therapy is not indicated at this time.   Rationale for discharge:  [x]     Goals Achieved  []     [address]  []     Patient not participating in therapy  []     Other:  Discharge Recommendations:  None  Further Equipment Recommendations for Discharge:  rolling walker SUBJECTIVE:   Patient stated What time can I leave?     OBJECTIVE DATA SUMMARY:   Critical Behavior:  Neurologic State: Alert  Orientation Level: Oriented to person  Cognition: Follows commands  Safety/Judgement: Fall prevention  Psychosocial  Patient Behaviors: Cooperative  Functional Mobility Training:  Bed Mobility:  Supine to Sit: Modified independent  Transfers:  Sit to Stand: Modified independent  Stand to Sit: Modified independent  Balance:   Sitting: Intact  Standing: Impaired  Standing - Static: Good  Standing - Dynamic : Good  Ambulation/Gait Training:  Distance (ft): 25 Feet (ft)   Assistive Device: Walker, rolling  Ambulation - Level of Assistance: Modified independent  Stairs: 6 step ups to 8 inch block  Level of Assistance: Supervision  Assistive Device: rolling walker  Rail Use: none; one  Number of Stairs: 3, 3  Therapeutic Exercises:   Sit to stand x2    Pain:  Pain level pre-treatment: 0/10   Pain level post-treatment: 0/10     Activity Tolerance:   Good    After treatment:   [x] Patient left in no apparent distress sitting up in chair  [] Patient left in no apparent distress in bed  [x] Call bell left within reach  [x] Nursing notified  [] Caregiver present  [] Bed alarm activated  [] SCDs applied      COMMUNICATION/EDUCATION:   [x]         Role of physical therapy in the acute care setting. [x]         Fall prevention education was provided and the patient/caregiver indicated understanding. [x]         Patient/family have participated as able and agree with findings and recommendations. []         Patient is unable to participate in plan of care at this time.        Sukhjinder Mccloud, PT   Time Calculation: 15 mins

## 2020-03-23 NOTE — PROGRESS NOTES
1925: Patient in bed awake,alert and oriented x4. No signs of distress. Bed low and locked. Call bell within reach. Will continue monitoring.    0604: In bed sleeping,uneventful night,no other concern at this time,call bell within reach. Will continue monitoring. Patient Vitals for the past 12 hrs:   Temp Pulse Resp BP SpO2   03/23/20 0400 98.1 °F (36.7 °C) (!) 59 18 120/83 100 %   03/22/20 2328 98.4 °F (36.9 °C) 63 18 120/74 99 %   03/22/20 2041 97.8 °F (36.6 °C) 69 18 121/80 99 %     0700: Bedside and Verbal shift change report given to Nelsy Gongora (oncoming nurse) by Arabella Sawyer (offgoing nurse). Report included the following information SBAR, Kardex, MAR and Recent Results.

## 2020-03-24 LAB
BACTERIA SPEC ANAEROBE CULT: NORMAL
BACTERIA SPEC CULT: NORMAL
BACTERIA SPEC CULT: NORMAL
GRAM STN SPEC: NORMAL
GRAM STN SPEC: NORMAL
SERVICE CMNT-IMP: NORMAL
SOURCE, RSRC56: NORMAL
SPECIMEN SOURCE: NORMAL

## 2020-03-25 LAB
BACTERIA SPEC CULT: ABNORMAL
BACTERIA SPEC CULT: ABNORMAL
SOURCE, RSRC56: ABNORMAL

## 2020-04-08 ENCOUNTER — TELEPHONE (OUTPATIENT)
Dept: FAMILY MEDICINE CLINIC | Age: 25
End: 2020-04-08

## 2020-04-08 NOTE — TELEPHONE ENCOUNTER
Pt's stepdaughter called and stated pt. Has staples in his left side of his leg and is in pain. Pt. Had a NP appt. On 04/10/2020. I advised her that we are not seeing pt's in office at this time and he is technically not our pt. She is asking what can the pt. Do since he is in pain. Can a VV be scheduled? Please advise.

## 2020-06-22 NOTE — ANESTHESIA POSTPROCEDURE EVALUATION
Procedure(s):  EXTREMITY IRRIGATION AND DEBRIDEMENT OF LEFT THIGH ABSCESS.     general    Anesthesia Post Evaluation      Multimodal analgesia: multimodal analgesia used between 6 hours prior to anesthesia start to PACU discharge  Patient location during evaluation: bedside  Patient participation: complete - patient participated  Level of consciousness: awake  Pain management: adequate  Airway patency: patent  Anesthetic complications: no  Cardiovascular status: stable  Respiratory status: acceptable  Hydration status: acceptable  Post anesthesia nausea and vomiting:  controlled      INITIAL Post-op Vital signs:   Vitals Value Taken Time   /71 3/20/2020  9:45 AM   Temp 36.9 °C (98.5 °F) 3/20/2020  9:16 AM   Pulse 84 3/20/2020  9:52 AM   Resp 12 3/20/2020  9:52 AM   SpO2 93 % 3/20/2020  9:52 AM

## (undated) DEVICE — BANDAGE,GAUZE,BULKEE II,4.5"X4.1YD,STRL: Brand: MEDLINE

## (undated) DEVICE — SUTURE ETHLN SZ 2-0 L18IN NONABSORBABLE BLK L26MM PS 3/8 585H

## (undated) DEVICE — MEDI-VAC YANKAUER SUCTION HANDLE W/BULBOUS TIP: Brand: CARDINAL HEALTH

## (undated) DEVICE — DRAPE,REIN 53X77,STERILE: Brand: MEDLINE

## (undated) DEVICE — HANDPIECE SET WITH SOFT TISSUE TIP AND SUCTION TUBE: Brand: INTERPULSE

## (undated) DEVICE — Device

## (undated) DEVICE — 3M™ COBAN™ NL STERILE NON-LATEX SELF-ADHERENT WRAP, 2084S, 4 IN X 5 YD (10 CM X 4,5 M), 18 ROLLS/CASE: Brand: 3M™ COBAN™

## (undated) DEVICE — SUTURE PDS II SZ 0 L27IN ABSRB VLT L36MM CT-1 1/2 CIR Z340H

## (undated) DEVICE — SPONGE GZ W4XL4IN COT 12 PLY TYP VII WVN C FLD DSGN

## (undated) DEVICE — DRESSING,GAUZE,XEROFORM,CURAD,1"X8",ST: Brand: CURAD

## (undated) DEVICE — BNDG CMPR ELAS KNT VEL STD 4IN -- MEDICHOICE

## (undated) DEVICE — CULTURETTE SGL EVAC TUBE PALL -- 100/CA

## (undated) DEVICE — STERILE POLYISOPRENE POWDER-FREE SURGICAL GLOVES: Brand: PROTEXIS

## (undated) DEVICE — SUT ETHLN 3-0 18IN PS2 BLK --

## (undated) DEVICE — INTENDED FOR TISSUE SEPARATION, AND OTHER PROCEDURES THAT REQUIRE A SHARP SURGICAL BLADE TO PUNCTURE OR CUT.: Brand: BARD-PARKER ® CARBON RIB-BACK BLADES

## (undated) DEVICE — KENDALL SCD EXPRESS SLEEVES, KNEE LENGTH, MEDIUM: Brand: KENDALL SCD

## (undated) DEVICE — SYRINGE IRRIG 60ML SFT PLIABLE BLB EZ TO GRP 1 HND USE W/

## (undated) DEVICE — DRAIN KT WND 10FR RND 400ML --

## (undated) DEVICE — REM POLYHESIVE ADULT PATIENT RETURN ELECTRODE: Brand: VALLEYLAB

## (undated) DEVICE — STERILE POLYISOPRENE POWDER-FREE SURGICAL GLOVES WITH EMOLLIENT COATING: Brand: PROTEXIS

## (undated) DEVICE — U-DRAPE: Brand: CONVERTORS

## (undated) DEVICE — ROCKER SWITCH PENCIL HOLSTER: Brand: VALLEYLAB

## (undated) DEVICE — DEPAUL UPPER EXTREMITY PACK: Brand: MEDLINE INDUSTRIES, INC.

## (undated) DEVICE — (D)STRIP SKN CLSR 0.5X4IN WHT --

## (undated) DEVICE — GOWN,AURORA,NON-REINFORCED,2XL: Brand: MEDLINE

## (undated) DEVICE — DRESSING NEG PRSS L W15XH3.3XL26CM BLK POLYUR DRP PD

## (undated) DEVICE — STAPLER SKIN H3.9MM WIRE DIA0.58MM CRWN 6.9MM 35 STPL FIX

## (undated) DEVICE — SUTURE ABSRB L30CM 2-0 VLT SPRL PDS + STRATAFIX SXPP1B410

## (undated) DEVICE — SOLUTION IV 1000ML 0.9% SOD CHL

## (undated) DEVICE — CONVERTORS STOCKINETTE: Brand: CONVERTORS

## (undated) DEVICE — SUTURE VCRL SZ 2-0 L27IN ABSRB UD L26MM SH 1/2 CIR J417H

## (undated) DEVICE — AMD ANTIMICROBIAL SUPER SPONGES,MEDIUM: Brand: KERLIX

## (undated) DEVICE — IMMOBILIZER KNEE PREMIER PRO TRI PNL 24INCH FOAM TIETEX PAT

## (undated) DEVICE — PAD,ABDOMINAL,5"X9",STERILE,LF,1/PK: Brand: MEDLINE INDUSTRIES, INC.

## (undated) DEVICE — TUBE PORT-A-CUL SWAB 11ML ST

## (undated) DEVICE — KIT PROC EXTRM HND FT CUST LF --

## (undated) DEVICE — SUTURE PDS II SZ 2-0 L27IN ABSRB VLT L36MM CT-1 1/2 CIR Z339H

## (undated) DEVICE — BNDG CMPR ELAS KNT VEL STD 6IN -- MEDICHOICE

## (undated) DEVICE — PACK,HEAVY DRAINAGE,STERILE: Brand: MEDLINE INDUSTRIES, INC.

## (undated) DEVICE — DRAPE,EXTREMITY,89X128,STERILE: Brand: MEDLINE